# Patient Record
Sex: FEMALE | Race: ASIAN | NOT HISPANIC OR LATINO | Employment: PART TIME | URBAN - METROPOLITAN AREA
[De-identification: names, ages, dates, MRNs, and addresses within clinical notes are randomized per-mention and may not be internally consistent; named-entity substitution may affect disease eponyms.]

---

## 2017-06-01 DIAGNOSIS — Z00.00 PE (PHYSICAL EXAM), ANNUAL: ICD-10-CM

## 2017-06-01 RX ORDER — NORGESTIMATE AND ETHINYL ESTRADIOL 7DAYSX3 28
KIT ORAL
Qty: 84 TABLET | Refills: 2 | Status: SHIPPED | OUTPATIENT
Start: 2017-06-01 | End: 2018-01-10 | Stop reason: SINTOL

## 2017-10-13 ENCOUNTER — OFFICE VISIT (OUTPATIENT)
Dept: INTERNAL MEDICINE | Facility: CLINIC | Age: 22
End: 2017-10-13

## 2017-10-13 VITALS
WEIGHT: 119 LBS | HEART RATE: 79 BPM | SYSTOLIC BLOOD PRESSURE: 110 MMHG | OXYGEN SATURATION: 98 % | DIASTOLIC BLOOD PRESSURE: 70 MMHG | TEMPERATURE: 98 F | HEIGHT: 64 IN | BODY MASS INDEX: 20.32 KG/M2

## 2017-10-13 DIAGNOSIS — J06.9 ACUTE URI: Primary | ICD-10-CM

## 2017-10-13 PROCEDURE — 99213 OFFICE O/P EST LOW 20 MIN: CPT | Performed by: NURSE PRACTITIONER

## 2017-10-13 RX ORDER — AZITHROMYCIN 250 MG/1
TABLET, FILM COATED ORAL
Qty: 6 TABLET | Refills: 0 | Status: SHIPPED | OUTPATIENT
Start: 2017-10-13 | End: 2018-01-03

## 2017-10-13 RX ORDER — GUAIFENESIN 600 MG/1
1200 TABLET, EXTENDED RELEASE ORAL 2 TIMES DAILY
Qty: 14 TABLET
Start: 2017-10-13 | End: 2017-10-20

## 2017-10-13 NOTE — PROGRESS NOTES
Anh Schaffer is a 22 y.o. female who presents due to respiratory symptoms.    URI    This is a new problem. The current episode started 1 to 4 weeks ago (sx began yandel 1 week). The problem has been gradually worsening. Associated symptoms include congestion, coughing, headaches, rhinorrhea and a sore throat. Pertinent negatives include no abdominal pain, chest pain, diarrhea, ear pain, nausea, neck pain, sneezing, vomiting or wheezing. She has tried antihistamine for the symptoms. The treatment provided mild relief.        The following portions of the patient's history were reviewed and updated as appropriate: allergies, current medications, past social history and problem list.    Past Medical History:   Diagnosis Date   • Hip pain    • Irregular menses          Current Outpatient Prescriptions:   •  Loratadine 10 MG capsule, Take  by mouth., Disp: , Rfl:   •  norgestimate-ethinyl estradiol (ORTHO TRI-CYCLEN,TRINESSA) 0.18/0.215/0.25 MG-35 MCG per tablet, TAKE ONE TABLET BY MOUTH DAILY, Disp: 84 tablet, Rfl: 2  •  azithromycin (ZITHROMAX Z-SAW) 250 MG tablet, Take 2 tablets the first day, then 1 tablet daily for 4 days., Disp: 6 tablet, Rfl: 0  •  guaiFENesin (MUCINEX) 600 MG 12 hr tablet, Take 2 tablets by mouth 2 (Two) Times a Day for 7 days., Disp: 14 tablet, Rfl:     No Known Allergies    Review of Systems   Constitutional: Positive for fatigue. Negative for appetite change, chills and fever.   HENT: Positive for congestion, postnasal drip, rhinorrhea and sore throat. Negative for ear discharge, ear pain, facial swelling, hearing loss, sinus pressure, sneezing and tinnitus.    Respiratory: Positive for cough. Negative for chest tightness, shortness of breath and wheezing.    Cardiovascular: Negative for chest pain, palpitations and leg swelling.   Gastrointestinal: Negative for abdominal pain, diarrhea, nausea and vomiting.   Musculoskeletal: Negative for neck pain and neck stiffness.   Neurological:  "Positive for headaches.   Hematological: Negative for adenopathy.       Objective   Vitals:    10/13/17 1356   BP: 110/70   BP Location: Left arm   Patient Position: Sitting   Cuff Size: Adult   Pulse: 79   Temp: 98 °F (36.7 °C)   TempSrc: Oral   SpO2: 98%   Weight: 119 lb (54 kg)   Height: 64\" (162.6 cm)     Physical Exam   Constitutional: She appears well-developed and well-nourished. She is cooperative. She does not have a sickly appearance. She does not appear ill.   HENT:   Head: Normocephalic.   Right Ear: Hearing and external ear normal. No drainage, swelling or tenderness. Tympanic membrane is bulging. Tympanic membrane is not erythematous. No middle ear effusion. No decreased hearing is noted.   Left Ear: Hearing and external ear normal. No drainage, swelling or tenderness. Tympanic membrane is bulging. Tympanic membrane is not erythematous.  No middle ear effusion. No decreased hearing is noted.   Nose: Nose normal. No mucosal edema, rhinorrhea, sinus tenderness or nasal deformity. Right sinus exhibits no maxillary sinus tenderness and no frontal sinus tenderness. Left sinus exhibits no maxillary sinus tenderness and no frontal sinus tenderness.   Mouth/Throat: Mucous membranes are normal. Posterior oropharyngeal erythema present.   Eyes: Conjunctivae and lids are normal.   Neck: Trachea normal.   Cardiovascular: Regular rhythm, normal heart sounds and normal pulses.    No murmur heard.  Pulmonary/Chest: Breath sounds normal. No respiratory distress. She has no decreased breath sounds. She has no wheezes. She has no rhonchi. She has no rales.   Lymphadenopathy:     She has no cervical adenopathy.   Neurological: She is alert.   Skin: Skin is warm, dry and intact.   Nursing note and vitals reviewed.      Assessment/Plan   Tia was seen today for sinus problem.    Diagnoses and all orders for this visit:    Acute URI  Comments:  continue Loratadine for postnasal drainage  Orders:  -     guaiFENesin (MUCINEX) " 600 MG 12 hr tablet; Take 2 tablets by mouth 2 (Two) Times a Day for 7 days.  -     azithromycin (ZITHROMAX Z-SAW) 250 MG tablet; Take 2 tablets the first day, then 1 tablet daily for 4 days.    Discussed interaction of birth control pills with antibiotics, advised to use back up contraception until next menses.

## 2018-01-03 ENCOUNTER — OFFICE VISIT (OUTPATIENT)
Dept: INTERNAL MEDICINE | Facility: CLINIC | Age: 23
End: 2018-01-03

## 2018-01-03 VITALS
WEIGHT: 121 LBS | DIASTOLIC BLOOD PRESSURE: 72 MMHG | HEIGHT: 64 IN | BODY MASS INDEX: 20.66 KG/M2 | HEART RATE: 80 BPM | SYSTOLIC BLOOD PRESSURE: 112 MMHG | OXYGEN SATURATION: 98 %

## 2018-01-03 DIAGNOSIS — Z00.00 PE (PHYSICAL EXAM), ANNUAL: Primary | ICD-10-CM

## 2018-01-03 LAB
ALBUMIN SERPL-MCNC: 4.7 G/DL (ref 3.5–5.2)
ALBUMIN/GLOB SERPL: 1.9 G/DL
ALP SERPL-CCNC: 63 U/L (ref 39–117)
ALT SERPL-CCNC: 9 U/L (ref 1–33)
AST SERPL-CCNC: 12 U/L (ref 1–32)
BACTERIA UR QL AUTO: ABNORMAL /HPF
BASOPHILS # BLD AUTO: 0.05 10*3/MM3 (ref 0–0.2)
BASOPHILS NFR BLD AUTO: 1 % (ref 0–1.5)
BILIRUB SERPL-MCNC: 0.2 MG/DL (ref 0.1–1.2)
BILIRUB UR QL STRIP: NEGATIVE
BUN SERPL-MCNC: 10 MG/DL (ref 6–20)
BUN/CREAT SERPL: 12 (ref 7–25)
CALCIUM SERPL-MCNC: 9.8 MG/DL (ref 8.6–10.5)
CHLORIDE SERPL-SCNC: 100 MMOL/L (ref 98–107)
CHOLEST SERPL-MCNC: 174 MG/DL (ref 0–200)
CLARITY UR: CLEAR
CO2 SERPL-SCNC: 29.6 MMOL/L (ref 22–29)
COLOR UR: YELLOW
CREAT SERPL-MCNC: 0.83 MG/DL (ref 0.57–1)
EOSINOPHIL # BLD AUTO: 0.25 10*3/MM3 (ref 0–0.7)
EOSINOPHIL # BLD AUTO: 4.8 % (ref 0.3–6.2)
ERYTHROCYTE [DISTWIDTH] IN BLOOD BY AUTOMATED COUNT: 12.6 % (ref 11.7–13)
GLOBULIN SER CALC-MCNC: 2.5 GM/DL
GLUCOSE SERPL-MCNC: 88 MG/DL (ref 65–99)
GLUCOSE UR STRIP-MCNC: NEGATIVE MG/DL
HCT VFR BLD AUTO: 44.3 % (ref 35.6–45.5)
HDLC SERPL-MCNC: 79 MG/DL (ref 40–60)
HGB BLD-MCNC: 14.1 G/DL (ref 11.9–15.5)
HGB UR QL STRIP.AUTO: ABNORMAL
HYALINE CASTS UR QL AUTO: ABNORMAL /LPF
IMM GRANULOCYTES # BLD: 0 10*3/MM3 (ref 0–0.03)
IMM GRANULOCYTES NFR BLD: 0 % (ref 0–0.5)
KETONES UR QL STRIP: NEGATIVE
LDLC SERPL CALC-MCNC: 76 MG/DL (ref 0–100)
LEUKOCYTE ESTERASE UR QL STRIP.AUTO: NEGATIVE
LYMPHOCYTES # BLD AUTO: 2.13 10*3/MM3 (ref 0.9–4.8)
LYMPHOCYTES NFR BLD AUTO: 41 % (ref 19.6–45.3)
MCH RBC QN AUTO: 29.4 PG (ref 26.9–32)
MCHC RBC AUTO-ENTMCNC: 31.8 G/DL (ref 32.4–36.3)
MCV RBC AUTO: 92.3 FL (ref 80.5–98.2)
MONOCYTES # BLD AUTO: 0.44 10*3/MM3 (ref 0.2–1.2)
MONOCYTES NFR BLD AUTO: 8.5 % (ref 5–12)
MUCOUS THREADS URNS QL MICRO: ABNORMAL /HPF
NEUTROPHILS # BLD AUTO: 2.32 10*3/MM3 (ref 1.9–8.1)
NEUTROPHILS NFR BLD AUTO: 44.7 % (ref 42.7–76)
NITRITE UR QL STRIP: NEGATIVE
PH UR STRIP.AUTO: 6 [PH] (ref 5–8)
PLATELET # BLD AUTO: 329 10*3/MM3 (ref 140–500)
POTASSIUM SERPL-SCNC: 4.6 MMOL/L (ref 3.5–5.2)
PROT SERPL-MCNC: 7.2 G/DL (ref 6–8.5)
PROT UR QL STRIP: NEGATIVE
RBC # BLD AUTO: 4.8 10*6/MM3 (ref 3.9–5.2)
RBC # UR: ABNORMAL /HPF
REF LAB TEST METHOD: ABNORMAL
SODIUM SERPL-SCNC: 139 MMOL/L (ref 136–145)
SP GR UR STRIP: 1.02 (ref 1–1.03)
SQUAMOUS #/AREA URNS HPF: ABNORMAL /HPF
TRIGL SERPL-MCNC: 96 MG/DL (ref 0–150)
TSH SERPL-ACNC: 2.59 MIU/ML (ref 0.27–4.2)
UROBILINOGEN UR QL STRIP: ABNORMAL
VLDLC SERPL-MCNC: 19.2 MG/DL (ref 5–40)
WBC # BLD AUTO: 5.19 10*3/MM3 (ref 4.5–10.7)
WBC UR QL AUTO: ABNORMAL /HPF

## 2018-01-03 PROCEDURE — 99395 PREV VISIT EST AGE 18-39: CPT | Performed by: NURSE PRACTITIONER

## 2018-01-03 PROCEDURE — 81001 URINALYSIS AUTO W/SCOPE: CPT | Performed by: NURSE PRACTITIONER

## 2018-01-03 NOTE — PROGRESS NOTES
Anh Schaffer is a 22 y.o. female who presents for a physical exam.    HPI Comments: She is home for her winter break from [employer], finishing her senior year. She is doing well but does c/o mood swings before her period, would like to adjust her birth control pills. Her wrist pain has improved with wearing wrist splints at night.       The following portions of the patient's history were reviewed and updated as appropriate: allergies, current medications, past social history and problem list.    Past Medical History:   Diagnosis Date   • Hip pain    • Irregular menses          Current Outpatient Prescriptions:   •  Loratadine 10 MG capsule, Take  by mouth., Disp: , Rfl:   •  norgestimate-ethinyl estradiol (ORTHO TRI-CYCLEN,TRINESSA) 0.18/0.215/0.25 MG-35 MCG per tablet, TAKE ONE TABLET BY MOUTH DAILY, Disp: 84 tablet, Rfl: 2    No Known Allergies    Review of Systems   Constitutional: Negative for activity change, appetite change, chills, diaphoresis, fatigue, fever and unexpected weight change.   HENT: Negative for congestion, dental problem, drooling, ear discharge, ear pain, facial swelling, hearing loss, mouth sores, nosebleeds, postnasal drip, rhinorrhea, sinus pressure, sore throat, tinnitus and trouble swallowing.    Eyes: Negative for photophobia, pain, discharge, redness, itching and visual disturbance.   Respiratory: Negative for apnea, cough, choking, chest tightness, shortness of breath and wheezing.    Cardiovascular: Negative for chest pain, palpitations and leg swelling.        No orthopnea, PND, COFFMAN   Gastrointestinal: Negative for abdominal pain, blood in stool, constipation, diarrhea, nausea and vomiting.   Endocrine: Negative for cold intolerance, heat intolerance, polydipsia and polyuria.   Genitourinary: Negative for decreased urine volume, dysuria, enuresis, flank pain, frequency, hematuria and urgency.   Musculoskeletal: Positive for arthralgias (intermittent hip pain  "(bilateral), has seen ortho in past). Negative for back pain, gait problem, joint swelling, myalgias, neck pain and neck stiffness.   Skin: Negative for color change and rash.        No hair changes, no nail changes   Allergic/Immunologic: Negative for environmental allergies, food allergies and immunocompromised state.   Neurological: Negative for dizziness, tremors, seizures, syncope, speech difficulty, weakness, light-headedness, numbness and headaches.   Hematological: Negative for adenopathy. Does not bruise/bleed easily.   Psychiatric/Behavioral: Negative for agitation, confusion, decreased concentration, dysphoric mood, sleep disturbance and suicidal ideas. The patient is not nervous/anxious.        Objective   Vitals:    01/03/18 0758   BP: 112/72   BP Location: Left arm   Patient Position: Sitting   Cuff Size: Adult   Pulse: 80   SpO2: 98%   Weight: 54.9 kg (121 lb)   Height: 162.6 cm (64\")     Physical Exam   Constitutional: She is oriented to person, place, and time. She appears well-developed and well-nourished. No distress.   HENT:   Right Ear: Hearing, tympanic membrane, external ear and ear canal normal.   Left Ear: Hearing, tympanic membrane, external ear and ear canal normal.   Nose: Right sinus exhibits no maxillary sinus tenderness and no frontal sinus tenderness. Left sinus exhibits no maxillary sinus tenderness and no frontal sinus tenderness.   Eyes: Conjunctivae, EOM and lids are normal. Pupils are equal, round, and reactive to light.   Neck: Trachea normal and phonation normal. Neck supple. Normal carotid pulses and no JVD present. Carotid bruit is not present. No thyroid mass and no thyromegaly present.   Cardiovascular: Normal rate, regular rhythm, S1 normal and S2 normal.  PMI is not displaced.  Exam reveals no gallop and no friction rub.    No murmur heard.  Pulses:       Carotid pulses are 2+ on the right side, and 2+ on the left side.       Radial pulses are 2+ on the right side, and 2+ " on the left side.        Dorsalis pedis pulses are 2+ on the right side, and 2+ on the left side.   Pulmonary/Chest: Effort normal and breath sounds normal. She has no wheezes. She has no rhonchi. She has no rales. Chest wall is not dull to percussion. Right breast exhibits no inverted nipple, no mass, no nipple discharge, no skin change and no tenderness. Left breast exhibits no inverted nipple, no mass, no nipple discharge, no skin change and no tenderness.   Abdominal: Soft. Normal appearance, normal aorta and bowel sounds are normal. She exhibits no abdominal bruit and no mass. There is no hepatosplenomegaly. There is no tenderness.   Musculoskeletal: Normal range of motion. She exhibits no edema or tenderness.   Lymphadenopathy:     She has no cervical adenopathy.        Right: No supraclavicular adenopathy present.        Left: No supraclavicular adenopathy present.   Neurological: She is alert and oriented to person, place, and time. She has normal strength and normal reflexes. No cranial nerve deficit or sensory deficit. Coordination normal.   Skin: Skin is warm and dry. No rash noted.   Psychiatric: She has a normal mood and affect. Her speech is normal and behavior is normal. Judgment and thought content normal. Cognition and memory are normal. She is attentive.   Nursing note and vitals reviewed.      Assessment/Plan   Tia was seen today for annual exam.    Diagnoses and all orders for this visit:    PE (physical exam), annual  Comments:  check fasting labs  Orders:  -     CBC Auto Differential; Future  -     Comprehensive Metabolic Panel; Future  -     Lipid Panel; Future  -     TSH; Future  -     Urinalysis With / Microscopic If Indicated - Urine, Clean Catch; Future  -     Cancel: CBC Auto Differential  -     Cancel: Comprehensive Metabolic Panel  -     Cancel: Lipid Panel  -     Cancel: TSH  -     Urinalysis With / Microscopic If Indicated - Urine, Clean Catch  -     Comprehensive Metabolic Panel;  Future  -     Lipid Panel; Future  -     TSH; Future  -     Comprehensive Metabolic Panel  -     Lipid Panel  -     TSH  -     Urinalysis, Microscopic Only - Urine, Clean Catch; Future  -     Urinalysis, Microscopic Only - Urine, Clean Catch  -     Scan Slide; Future  -     Cancel: Scan Slide  -     Manual Differential; Future  -     Cancel: Manual Differential  -     CBC & Differential; Future  -     CBC & Differential  -     CBC Auto Differential    She is currently on menses, will return next week for pap smear.

## 2018-01-10 ENCOUNTER — PROCEDURE VISIT (OUTPATIENT)
Dept: INTERNAL MEDICINE | Facility: CLINIC | Age: 23
End: 2018-01-10

## 2018-01-10 VITALS
HEART RATE: 73 BPM | HEIGHT: 64 IN | OXYGEN SATURATION: 99 % | WEIGHT: 124 LBS | DIASTOLIC BLOOD PRESSURE: 70 MMHG | BODY MASS INDEX: 21.17 KG/M2 | SYSTOLIC BLOOD PRESSURE: 112 MMHG

## 2018-01-10 DIAGNOSIS — R87.612 LGSIL ON PAP SMEAR OF CERVIX: ICD-10-CM

## 2018-01-10 DIAGNOSIS — Z30.41 ENCOUNTER FOR SURVEILLANCE OF CONTRACEPTIVE PILLS: ICD-10-CM

## 2018-01-10 DIAGNOSIS — Z12.4 SCREENING FOR CERVICAL CANCER: Primary | ICD-10-CM

## 2018-01-10 PROCEDURE — 99395 PREV VISIT EST AGE 18-39: CPT | Performed by: NURSE PRACTITIONER

## 2018-01-10 RX ORDER — NORETHINDRONE ACETATE AND ETHINYL ESTRADIOL 1; .02 MG/1; MG/1
1 TABLET ORAL DAILY
Qty: 21 TABLET | Refills: 12 | Status: SHIPPED | OUTPATIENT
Start: 2018-01-10 | End: 2018-04-19

## 2018-01-11 NOTE — PROGRESS NOTES
"Subjective   Tia Schaffer is a 22 y.o. female who presents for a pap smear.    HPI Comments: She c/o increased irritability and mood swings prior to period since beginning birth control pills, would like to change.    Gynecologic Exam   The patient's pertinent negatives include no genital itching, genital lesions, genital odor or pelvic pain. The patient is experiencing no pain. She is not pregnant. Pertinent negatives include no abdominal pain, dysuria, flank pain or frequency. She is sexually active. No, her partner does not have an STD. She uses oral contraceptives for contraception. Her menstrual history has been regular.        The following portions of the patient's history were reviewed and updated as appropriate: allergies, current medications, past social history and problem list.    Past Medical History:   Diagnosis Date   • Hip pain    • Irregular menses          Current Outpatient Prescriptions:   •  Loratadine 10 MG capsule, Take  by mouth., Disp: , Rfl:   •  norethindrone-ethinyl estradiol (MICROGESTIN 1/20) 1-20 MG-MCG per tablet, Take 1 tablet by mouth Daily., Disp: 21 tablet, Rfl: 12    No Known Allergies    Review of Systems   Constitutional: Negative for fatigue.   HENT: Positive for postnasal drip. Negative for congestion.    Respiratory: Negative for shortness of breath.    Cardiovascular: Negative for chest pain.   Gastrointestinal: Negative for abdominal pain.   Genitourinary: Negative for difficulty urinating, dyspareunia, dysuria, flank pain, frequency, genital sores, menstrual problem and pelvic pain.   Psychiatric/Behavioral: Positive for dysphoric mood. Negative for sleep disturbance. The patient is not nervous/anxious.        Objective   Vitals:    01/10/18 1538   BP: 112/70   BP Location: Left arm   Patient Position: Sitting   Cuff Size: Adult   Pulse: 73   SpO2: 99%   Weight: 56.2 kg (124 lb)   Height: 162.6 cm (64\")     Physical Exam   Constitutional: She appears well-developed and " well-nourished. No distress.   Eyes: Conjunctivae are normal.   Neck: Normal range of motion.   Cardiovascular: Normal rate and regular rhythm.    Pulmonary/Chest: Effort normal and breath sounds normal. No respiratory distress.   Abdominal: Soft. Bowel sounds are normal. There is no tenderness.   Genitourinary: Vagina normal and uterus normal. Pelvic exam was performed with patient supine. There is no rash, tenderness, lesion or injury on the right labia. There is no rash, tenderness, lesion or injury on the left labia. Cervix exhibits no motion tenderness. Right adnexum displays no mass, no tenderness and no fullness. Left adnexum displays no mass, no tenderness and no fullness. No vaginal discharge found.       Assessment/Plan   Tia was seen today for gynecologic exam.    Diagnoses and all orders for this visit:    Screening for cervical cancer  -     Pap IG, Rfx HPV ASCU - , Cervix; Future  -     Pap IG, Rfx HPV ASCU - , Cervix    Encounter for surveillance of contraceptive pills  Comments:  will change to lower dose BCP and monitor mood swings/irritability prior to menses  Orders:  -     norethindrone-ethinyl estradiol (MICROGESTIN 1/20) 1-20 MG-MCG per tablet; Take 1 tablet by mouth Daily.

## 2018-01-15 LAB
CHROM ANALY OVERALL INTERP-IMP: ABNORMAL
CONV .: ABNORMAL
CONV .: ABNORMAL
CONV PERFORMED BY:: ABNORMAL
DX ICD CODE: ABNORMAL
HIV 1 & 2 AB SER-IMP: ABNORMAL
PATHOLOGIST NAME: ABNORMAL
PATHOLOGIST PROVIDED ICD-10:: ABNORMAL
REF LAB TEST METHOD: ABNORMAL
STAT OF ADQ CVX/VAG CYTO-IMP: ABNORMAL

## 2018-01-16 LAB — SPECIMEN STATUS: NORMAL

## 2018-01-17 LAB — HPV I/H RISK 1 DNA CVX QL PROBE+SIG AMP: NEGATIVE

## 2018-01-26 ENCOUNTER — TELEPHONE (OUTPATIENT)
Dept: INTERNAL MEDICINE | Facility: CLINIC | Age: 23
End: 2018-01-26

## 2018-01-26 DIAGNOSIS — R87.612 LOW GRADE SQUAMOUS INTRAEPITH LESION ON CYTOLOGIC SMEAR CERVIX (LGSIL): Primary | ICD-10-CM

## 2018-01-26 NOTE — TELEPHONE ENCOUNTER
----- Message from FABBY Enamorado sent at 1/26/2018  9:32 AM EST -----  Dicussed with mother, Cyndi Schaffer (on 3rd party release). Please mail a copy of the pap smear to home address (in chart) and give Cyndi's cell phone number ([phone]) to [facility] for the referral. Thanks.

## 2018-01-30 ENCOUNTER — TELEPHONE (OUTPATIENT)
Dept: INTERNAL MEDICINE | Facility: CLINIC | Age: 23
End: 2018-01-30

## 2018-01-30 NOTE — TELEPHONE ENCOUNTER
Discussed with mother-she denies vaginal pain with intermittent odor. She is scheduled with a GYN in March to f/u on abnl pap. She is advised to see GYN in [place] if discharge persists (she is due for her period) or f/u in this office.

## 2018-01-30 NOTE — TELEPHONE ENCOUNTER
----- Message from Nelda Gutierrez sent at 1/30/2018  9:33 AM EST -----  Contact: Cyndi, mother - Dr Schmidt's pt - RE: Rx & appt  Pt's mother calling and would like to inform that pt has appt w/ [facility]. Pt has appt on 03/22/2018 for procedure.   Pt would like to know pt has discharge - sometimes white or green. Pt informs it comes & goes. Pt would like to inform that it has a small smell. Pt would like to know if a Rx is needed - antibiotic? Could you please call mother to discuss as pt is in school @ [employer].    [facility] [place]  # [phone]    Cyndi, mother  [phone]

## 2018-03-23 DIAGNOSIS — Z00.00 PE (PHYSICAL EXAM), ANNUAL: ICD-10-CM

## 2018-03-23 RX ORDER — NORGESTIMATE AND ETHINYL ESTRADIOL
KIT
Qty: 84 TABLET | Refills: 2 | OUTPATIENT
Start: 2018-03-23

## 2018-03-23 NOTE — TELEPHONE ENCOUNTER
I spoke to Ms. Schaffer and she stated that she is doing fine on the Microgestin 1/20 and she is on her 2nd pack.    Her [facility] sent us a refill request automatically on Tri-Previfem birth control  with out her request.     I told her that I would refuse this. She stated she understood and thank you.

## 2018-03-23 NOTE — TELEPHONE ENCOUNTER
pls call - Chaparrita changed to lower dose birth control med - can we continue low dose pill or is she having problems with low dose?

## 2018-03-26 DIAGNOSIS — Z00.00 PE (PHYSICAL EXAM), ANNUAL: ICD-10-CM

## 2018-03-27 ENCOUNTER — TELEPHONE (OUTPATIENT)
Dept: INTERNAL MEDICINE | Facility: CLINIC | Age: 23
End: 2018-03-27

## 2018-03-27 NOTE — TELEPHONE ENCOUNTER
----- Message from Aida Colbert sent at 3/27/2018 10:26 AM EDT -----  Contact: Patients mother Marsha  Patients mother called and said they need a refill on norethindrone-ethinyl estradiol (MICROGESTIN 1/20) 1-20 MG-MCG per tablet. Please advise    Patient:[phone](Marsha)    Pharmacy:[place] [address] [place] [facility] - [address]  - [phone] [facility] [phone] FX

## 2018-03-27 NOTE — TELEPHONE ENCOUNTER
Call made to patients mother Cyndi to inform her that she had refills of Microgestin 1/20 sent in to her [facility] pharmacy on 1/20/18.    Her mother stated that she would contact the pharmacy and have them to fill this for her her daughter.

## 2018-03-28 RX ORDER — NORGESTIMATE AND ETHINYL ESTRADIOL
KIT
Qty: 84 TABLET | Refills: 2 | Status: SHIPPED | OUTPATIENT
Start: 2018-03-28 | End: 2018-10-15

## 2018-04-19 ENCOUNTER — PROCEDURE VISIT (OUTPATIENT)
Dept: OBSTETRICS AND GYNECOLOGY | Age: 23
End: 2018-04-19

## 2018-04-19 VITALS
HEIGHT: 64 IN | BODY MASS INDEX: 20.66 KG/M2 | SYSTOLIC BLOOD PRESSURE: 102 MMHG | DIASTOLIC BLOOD PRESSURE: 70 MMHG | WEIGHT: 121 LBS

## 2018-04-19 DIAGNOSIS — Z11.3 SCREEN FOR STD (SEXUALLY TRANSMITTED DISEASE): ICD-10-CM

## 2018-04-19 DIAGNOSIS — Z01.812 PRE-PROCEDURE LAB EXAM: ICD-10-CM

## 2018-04-19 DIAGNOSIS — N89.8 VAGINAL DISCHARGE: ICD-10-CM

## 2018-04-19 DIAGNOSIS — R87.612 LGSIL ON PAP SMEAR OF CERVIX: Primary | ICD-10-CM

## 2018-04-19 LAB
B-HCG UR QL: NEGATIVE
INTERNAL NEGATIVE CONTROL: NEGATIVE
INTERNAL POSITIVE CONTROL: POSITIVE
Lab: NORMAL

## 2018-04-19 PROCEDURE — 81025 URINE PREGNANCY TEST: CPT | Performed by: OBSTETRICS & GYNECOLOGY

## 2018-04-19 PROCEDURE — 57452 EXAM OF CERVIX W/SCOPE: CPT | Performed by: OBSTETRICS & GYNECOLOGY

## 2018-04-19 PROCEDURE — 99203 OFFICE O/P NEW LOW 30 MIN: CPT | Performed by: OBSTETRICS & GYNECOLOGY

## 2018-04-19 NOTE — PROGRESS NOTES
"This encounter was created in error - please disregard.Colposcopy Procedure Note    Indications: Pap smear {0-10:78684} months ago showed: {Findings; lab pap smear results:15653::\"no abnormalities\"}. The prior pap showed {Findings; lab pap smear results:40145::\"no abnormalities\"}.  Prior cervical/vaginal disease: {Exam; colposcopy summary:733}. Prior cervical treatment: {Therapies; recommendations colposcopy:727}.    Procedure Details   The risks and benefits of the procedure and {verbal:40350} informed consent obtained.    Speculum placed in vagina and excellent visualization of cervix achieved, cervix swabbed x 3 with acetic acid solution.    Findings:  Cervix: {Findings; colposcopy:728}; {Procedures; colposcopy:729}.  Vaginal inspection: {Findings; colposcopy:730}.  Vulvar colposcopy: {Exam; colposcopy vulvar:731}.    Specimens: ***    Complications: {complications:07024}.    Plan:  {Plan; colonoscopy:734}  "

## 2018-04-19 NOTE — PROGRESS NOTES
"Subjective     Chief Complaint   Patient presents with   • Colposcopy       Tia Sarbjit is a 22 y.o.  whose LMP is Patient's last menstrual period was 2018 (approximate). presents for [facility] as referral for LGSIL pap 2018. HPV was negative for high risk type. This was her first pap. She is sexually active and takes ocps. She has had screening for STD. She has had the Gardasil vaccine series. Her menses are normal q mo, lasting 3-4 days. She complains of some vaginal dc that has been present for a long time. Denies itching or odor, but it's just bothersome.      No Additional Complaints Reported    The following portions of the patient's history were reviewed and updated as appropriate:vital signs, allergies, current medications, past family history, past medical history, past social history, past surgical history and problem list      Review of Systems   Constitutional: negative for fever, chills, activity change, appetite change, fatigue and unexpected weight change.  Eyes: negative  Ears, nose, mouth, throat, and face: negative  Respiratory: negative  Cardiovascular: negative  Gastrointestinal: negative  Genitourinary:positive for vaginal discharge  Hematologic/lymphatic: negative  Musculoskeletal:negative  Neurological: negative  Behavioral/Psych: negative     Objective      /70   Ht 162.6 cm (64\")   Wt 54.9 kg (121 lb)   LMP 2018 (Approximate)   BMI 20.77 kg/m²     Physical Exam    General:   alert, appears stated age and no distress   Heart: Not performed today   Lungs: Not performed today.   Breast: Not performed today   Neck:     Abdomen: {    CVA: Not performed today   Pelvis: External genitalia: normal general appearance  Urinary system: urethral meatus normal  Vaginal: normal mucosa without prolapse or lesions and small amt of thick yellow-white dc in vault  Cervix: normal appearance and no lesions, nullip os   Extremities: Extremities normal, atraumatic, no cyanosis or " edema   Neurologic: negative   Psychiatric: Normal affect, judgement, and mood       Lab Review   Labs: 1/2018 pap LGSIL, HPV- High risk , urine pregnancy test negative    Imaging   No data reviewed    Assessment/Plan     ASSESSMENT  1. LGSIL on Pap smear of cervix    2. Pre-procedure lab exam    3. Vaginal discharge    4. Screen for STD (sexually transmitted disease)        PLAN  1.   Orders Placed This Encounter   Procedures   • NuSwab VG+ - Swab, Vagina   • POC Pregnancy, Urine       2. Medications prescribed this encounter:      No orders of the defined types were placed in this encounter.    Colposcopy Procedure Note    Indications: Pap smear 3 months ago showed: low-grade squamous intraepithelial neoplasia (LGSIL - encompassing HPV,mild dysplasia,MIKE I). No prior paps.   Prior cervical treatment: no treatment.    Procedure Details   The risks and benefits of the procedure and Verbal informed consent obtained.    Speculum placed in vagina and excellent visualization of cervix achieved, cervix swabbed x 3 with acetic acid solution. No biopsy performed. Pt tolerated procedure well.    Findings:  Cervix: no visible lesions;         Specimens: none    Complications: none.    Plan:  Discussed abnormal pap, HPV and cervical cancer, plan conservative f/u w/ pap q 6 mo. Swab sent for vaginitis panel and GC/Chl testing      Follow up: 6 month(s)    Sonya Bernal MD  4/19/2018

## 2018-04-23 LAB
A VAGINAE DNA VAG QL NAA+PROBE: NORMAL SCORE
BVAB2 DNA VAG QL NAA+PROBE: NORMAL SCORE
C ALBICANS DNA VAG QL NAA+PROBE: NEGATIVE
C GLABRATA DNA VAG QL NAA+PROBE: NEGATIVE
C TRACH RRNA SPEC QL NAA+PROBE: NEGATIVE
MEGA1 DNA VAG QL NAA+PROBE: NORMAL SCORE
N GONORRHOEA RRNA SPEC QL NAA+PROBE: NEGATIVE
T VAGINALIS RRNA SPEC QL NAA+PROBE: NEGATIVE

## 2018-04-24 ENCOUNTER — TELEPHONE (OUTPATIENT)
Dept: OBSTETRICS AND GYNECOLOGY | Age: 23
End: 2018-04-24

## 2018-04-24 NOTE — TELEPHONE ENCOUNTER
----- Message from Sonya Bernal MD sent at 4/24/2018 11:40 AM EDT -----  Swab was completely negative, tested for GC/Chl/trich, BV and yeast

## 2018-05-01 ENCOUNTER — TELEPHONE (OUTPATIENT)
Dept: OBSTETRICS AND GYNECOLOGY | Age: 23
End: 2018-05-01

## 2018-05-14 ENCOUNTER — OFFICE VISIT (OUTPATIENT)
Dept: INTERNAL MEDICINE | Facility: CLINIC | Age: 23
End: 2018-05-14

## 2018-05-14 VITALS
BODY MASS INDEX: 20.43 KG/M2 | OXYGEN SATURATION: 98 % | TEMPERATURE: 98.4 F | DIASTOLIC BLOOD PRESSURE: 60 MMHG | WEIGHT: 119 LBS | HEART RATE: 71 BPM | SYSTOLIC BLOOD PRESSURE: 98 MMHG

## 2018-05-14 DIAGNOSIS — R68.89 ITCHY EYES: ICD-10-CM

## 2018-05-14 DIAGNOSIS — J01.90 ACUTE SINUSITIS, RECURRENCE NOT SPECIFIED, UNSPECIFIED LOCATION: Primary | ICD-10-CM

## 2018-05-14 PROCEDURE — 99213 OFFICE O/P EST LOW 20 MIN: CPT | Performed by: NURSE PRACTITIONER

## 2018-05-14 RX ORDER — GUAIFENESIN 600 MG/1
600 TABLET, EXTENDED RELEASE ORAL 2 TIMES DAILY
Qty: 14 TABLET | Refills: 0
Start: 2018-05-14 | End: 2018-05-21

## 2018-05-14 RX ORDER — KETOTIFEN FUMARATE 0.35 MG/ML
1 SOLUTION/ DROPS OPHTHALMIC 2 TIMES DAILY PRN
Qty: 5 ML | Refills: 0
Start: 2018-05-14 | End: 2020-06-29 | Stop reason: SDUPTHER

## 2018-05-14 RX ORDER — AZITHROMYCIN 250 MG/1
250 TABLET, FILM COATED ORAL DAILY
Qty: 6 TABLET | Refills: 0 | Status: SHIPPED | OUTPATIENT
Start: 2018-05-14 | End: 2018-05-19

## 2018-05-14 NOTE — PATIENT INSTRUCTIONS

## 2018-05-14 NOTE — PROGRESS NOTES
Subjective   Tia Sarbjit is a 22 y.o. female.     No recent air travel . Her boyfriend has been sick. She is moving to [place].       Sinus Problem   This is a new problem. The current episode started 1 to 4 weeks ago. The problem has been gradually worsening since onset. There has been no fever. Associated symptoms include congestion, coughing, headaches and sinus pressure. Pertinent negatives include no chills, ear pain or shortness of breath. (Right ear pressure ) Treatments tried: mucinex, bendarly, allergy medication, nasacort         The following portions of the patient's history were reviewed and updated as appropriate: allergies, current medications and problem list.    Review of Systems   Constitutional: Negative for appetite change, chills, fatigue and fever.   HENT: Positive for congestion, postnasal drip, rhinorrhea, sinus pain and sinus pressure. Negative for ear discharge, ear pain, facial swelling, hearing loss and tinnitus.    Respiratory: Positive for cough. Negative for chest tightness, shortness of breath and wheezing.    Cardiovascular: Negative for chest pain, palpitations and leg swelling.   Gastrointestinal: Negative for abdominal pain, diarrhea, nausea and vomiting.   Allergic/Immunologic: Positive for environmental allergies.   Neurological: Positive for dizziness (intermittent ) and headaches.   Hematological: Negative for adenopathy.       Objective   Physical Exam   Constitutional: She appears well-developed and well-nourished. She is cooperative. She does not have a sickly appearance. She does not appear ill.   HENT:   Head: Normocephalic.   Right Ear: Hearing and external ear normal. No drainage, swelling or tenderness. No mastoid tenderness. Tympanic membrane is bulging. Tympanic membrane is not injected, not scarred and not erythematous. Tympanic membrane mobility is normal. No middle ear effusion. No decreased hearing is noted.   Left Ear: Hearing and external ear normal. No  drainage, swelling or tenderness. No mastoid tenderness. Tympanic membrane is bulging. Tympanic membrane is not injected, not scarred and not erythematous. Tympanic membrane mobility is normal.  No middle ear effusion. No decreased hearing is noted.   Nose: Mucosal edema and rhinorrhea present. No sinus tenderness or nasal deformity. Right sinus exhibits maxillary sinus tenderness. Right sinus exhibits no frontal sinus tenderness. Left sinus exhibits maxillary sinus tenderness. Left sinus exhibits no frontal sinus tenderness.   Mouth/Throat: Mucous membranes are normal. Normal dentition. Posterior oropharyngeal erythema (clear hypersecretions ) present.   Eyes: Conjunctivae and lids are normal. Pupils are equal, round, and reactive to light. Right eye exhibits no discharge and no exudate. Left eye exhibits no discharge and no exudate.   Neck: Trachea normal and normal range of motion. No edema present. No thyroid mass and no thyromegaly present.   Cardiovascular: Regular rhythm, normal heart sounds and normal pulses.    No murmur heard.  Pulmonary/Chest: Breath sounds normal. No respiratory distress. She has no decreased breath sounds. She has no wheezes. She has no rhonchi. She has no rales.   Lymphadenopathy:        Head (right side): No submental, no submandibular, no tonsillar, no preauricular, no posterior auricular and no occipital adenopathy present.        Head (left side): No submental, no submandibular, no tonsillar, no preauricular, no posterior auricular and no occipital adenopathy present.     She has no cervical adenopathy.   Neurological: She is alert.   Skin: Skin is warm, dry and intact. No cyanosis. Nails show no clubbing.       Assessment/Plan   Tia was seen today for sinus problem.    Diagnoses and all orders for this visit:    Acute sinusitis, recurrence not specified, unspecified location  Comments:  continue with nasacort, may add ocean spray, push fluids   Orders:  -     guaiFENesin (MUCINEX)  600 MG 12 hr tablet; Take 1 tablet by mouth 2 (Two) Times a Day for 7 days.  -     azithromycin (ZITHROMAX Z-SAW) 250 MG tablet; Take 1 tablet by mouth Daily for 5 days. Take 2 tablets the first day, then 1 tablet daily for 4 days.    Itchy eyes  Comments:  may use otc zaditor   Orders:  -     ketotifen (ZADITOR) 0.025 % ophthalmic solution; Administer 1 drop to both eyes 2 (Two) Times a Day As Needed (itchy eyes).    She was instructed to use alternative birth control while taking antibiotic and until next menses.

## 2018-10-15 ENCOUNTER — OFFICE VISIT (OUTPATIENT)
Dept: INTERNAL MEDICINE | Facility: CLINIC | Age: 23
End: 2018-10-15

## 2018-10-15 VITALS
SYSTOLIC BLOOD PRESSURE: 100 MMHG | DIASTOLIC BLOOD PRESSURE: 70 MMHG | HEART RATE: 68 BPM | WEIGHT: 121 LBS | BODY MASS INDEX: 20.66 KG/M2 | OXYGEN SATURATION: 98 % | HEIGHT: 64 IN

## 2018-10-15 DIAGNOSIS — N76.0 BACTERIAL VAGINOSIS: ICD-10-CM

## 2018-10-15 DIAGNOSIS — N89.8 VAGINAL DISCHARGE: ICD-10-CM

## 2018-10-15 DIAGNOSIS — B96.89 BACTERIAL VAGINOSIS: ICD-10-CM

## 2018-10-15 DIAGNOSIS — M72.2 PLANTAR FASCIITIS, LEFT: Primary | ICD-10-CM

## 2018-10-15 DIAGNOSIS — B37.31 VAGINAL CANDIDIASIS: ICD-10-CM

## 2018-10-15 LAB
B-HCG UR QL: NEGATIVE
CLUE CELLS SPEC QL WET PREP: ABNORMAL
HYDATID CYST SPEC WET PREP: ABNORMAL
INTERNAL NEGATIVE CONTROL: NEGATIVE
INTERNAL POSITIVE CONTROL: POSITIVE
KOH PREP NAIL: ABNORMAL
Lab: NORMAL
T VAGINALIS SPEC QL WET PREP: ABNORMAL
WBC SPEC QL WET PREP: ABNORMAL
YEAST GENITAL QL WET PREP: ABNORMAL

## 2018-10-15 PROCEDURE — 87210 SMEAR WET MOUNT SALINE/INK: CPT | Performed by: NURSE PRACTITIONER

## 2018-10-15 PROCEDURE — 99214 OFFICE O/P EST MOD 30 MIN: CPT | Performed by: NURSE PRACTITIONER

## 2018-10-15 PROCEDURE — 87220 TISSUE EXAM FOR FUNGI: CPT | Performed by: NURSE PRACTITIONER

## 2018-10-15 PROCEDURE — 81025 URINE PREGNANCY TEST: CPT | Performed by: NURSE PRACTITIONER

## 2018-10-15 RX ORDER — FLUCONAZOLE 150 MG/1
TABLET ORAL
Qty: 2 TABLET | Refills: 0 | Status: SHIPPED | OUTPATIENT
Start: 2018-10-15 | End: 2020-06-29 | Stop reason: SDUPTHER

## 2018-10-15 RX ORDER — NORETHINDRONE ACETATE AND ETHINYL ESTRADIOL 1; 20 MG/1; UG/1
1 TABLET ORAL DAILY
Refills: 12 | COMMUNITY
Start: 2018-08-20 | End: 2020-06-29 | Stop reason: SDUPTHER

## 2018-10-15 RX ORDER — METRONIDAZOLE 7.5 MG/G
GEL VAGINAL NIGHTLY
Qty: 70 G | Refills: 0 | Status: SHIPPED | OUTPATIENT
Start: 2018-10-15 | End: 2018-10-20

## 2018-10-16 NOTE — PROGRESS NOTES
Anh Schfafer is a 23 y.o. female who presents due to left foot pain. She also c/o vaginal discharge and itching.    She c/o a vaginal discharge with mild vaginal itching, denies dysuria. She recently returned from [place] where she had unprotected sex with her boyfriend. She does take Junel daily but reports missing 1 pill over recent cycle. She finished her menses last week which she states was normal.      Foot Pain   This is a new problem. The current episode started 1 to 4 weeks ago (sx began after walking long distances in [place]). The problem occurs daily. The problem has been waxing and waning. Associated symptoms include arthralgias. Pertinent negatives include no abdominal pain, chest pain, chills, congestion, coughing, fatigue, fever, headaches, joint swelling, numbness, sore throat or weakness. The symptoms are aggravated by standing (worse after prolonged sitting). She has tried nothing for the symptoms.   Vaginitis   This is a new problem. The current episode started in the past 7 days. The problem occurs daily. The problem has been gradually worsening. Associated symptoms include arthralgias. Pertinent negatives include no abdominal pain, chest pain, chills, congestion, coughing, fatigue, fever, headaches, joint swelling, numbness, sore throat or weakness.        The following portions of the patient's history were reviewed and updated as appropriate: allergies, current medications, past social history and problem list.    Past Medical History:   Diagnosis Date   • Abnormal Pap smear of cervix 01/2018    LGSIL   • Hip pain    • Irregular menses          Current Outpatient Prescriptions:   •  JUNEL 1/20 1-20 MG-MCG per tablet, Take 1 tablet by mouth Daily., Disp: , Rfl: 12  •  ketotifen (ZADITOR) 0.025 % ophthalmic solution, Administer 1 drop to both eyes 2 (Two) Times a Day As Needed (itchy eyes)., Disp: 5 mL, Rfl: 0  •  Loratadine 10 MG capsule, Take  by mouth., Disp: , Rfl:   •  fluconazole  "(DIFLUCAN) 150 MG tablet, Today, may repeat in 3 days if needed, Disp: 2 tablet, Rfl: 0  •  metroNIDAZOLE (METROGEL) 0.75 % vaginal gel, Insert  into the vagina Every Night for 5 days., Disp: 70 g, Rfl: 0    No Known Allergies    Review of Systems   Constitutional: Negative for chills, fatigue, fever and unexpected weight change.   HENT: Negative for congestion, ear pain, postnasal drip, sinus pressure, sore throat and trouble swallowing.    Eyes: Negative for visual disturbance.   Respiratory: Negative for cough, chest tightness and wheezing.    Cardiovascular: Negative for chest pain, palpitations and leg swelling.   Gastrointestinal: Negative for abdominal pain and blood in stool.   Genitourinary: Positive for vaginal discharge. Negative for dysuria, frequency and urgency.   Musculoskeletal: Positive for arthralgias. Negative for joint swelling.   Skin: Negative for color change.   Neurological: Negative for syncope, weakness, numbness and headaches.   Hematological: Does not bruise/bleed easily.       Objective   Vitals:    10/15/18 0930   BP: 100/70   BP Location: Left arm   Patient Position: Sitting   Cuff Size: Adult   Pulse: 68   SpO2: 98%   Weight: 54.9 kg (121 lb)   Height: 162.6 cm (64\")     Physical Exam   Constitutional: She appears well-developed and well-nourished. She is cooperative. She does not have a sickly appearance. She does not appear ill.   HENT:   Head: Normocephalic.   Right Ear: Hearing, tympanic membrane and external ear normal.   Left Ear: Hearing, tympanic membrane and external ear normal.   Nose: Nose normal. No mucosal edema, rhinorrhea, sinus tenderness or nasal deformity. Right sinus exhibits no maxillary sinus tenderness and no frontal sinus tenderness. Left sinus exhibits no maxillary sinus tenderness and no frontal sinus tenderness.   Mouth/Throat: Oropharynx is clear and moist and mucous membranes are normal. Normal dentition.   Eyes: Conjunctivae and lids are normal. Right eye " exhibits no discharge and no exudate. Left eye exhibits no discharge and no exudate.   Neck: Trachea normal. Carotid bruit is not present. No edema present. No thyroid mass present.   Cardiovascular: Regular rhythm, normal heart sounds and normal pulses.    No murmur heard.  Pulmonary/Chest: Breath sounds normal. No respiratory distress. She has no decreased breath sounds. She has no wheezes. She has no rhonchi. She has no rales.   Abdominal: Soft. There is no tenderness.   Genitourinary: There is erythema in the vagina. Vaginal discharge found.   Musculoskeletal:        Left foot: There is tenderness (plantar tenderness, worse with flexion of foot). There is normal range of motion and no swelling.   Lymphadenopathy:        Head (right side): No submental, no submandibular, no tonsillar, no preauricular, no posterior auricular and no occipital adenopathy present.        Head (left side): No submental, no submandibular, no tonsillar, no preauricular, no posterior auricular and no occipital adenopathy present.   Neurological: She is alert.   Skin: Skin is warm, dry and intact. No cyanosis. Nails show no clubbing.       Assessment/Plan   Tia was seen today for foot pain and vaginitis.    Diagnoses and all orders for this visit:    Plantar fasciitis, left  Comments:  handout given with stretching exercises, discussed benefits of heel inserts    Vaginal candidiasis  Comments:  +yeast noted on KOH/wet prep  Orders:  -     fluconazole (DIFLUCAN) 150 MG tablet; Today, may repeat in 3 days if needed    Bacterial vaginosis  Comments:  +WBCs, bacteria noted  Orders:  -     metroNIDAZOLE (METROGEL) 0.75 % vaginal gel; Insert  into the vagina Every Night for 5 days.    Vaginal discharge  -     KOH Prep - Swab, Cervix  -     Wet Prep, Genital - Swab, Vagina  -     POCT pregnancy, urine  -     Cancel: Chlamydia trachomatis, Neisseria gonorrhoeae, PCR - Swab, Vagina; Future  -     Chlamydia trachomatis, Neisseria gonorrhoeae, PCR -  Urine, Urine, Clean Catch; Future  -     Chlamydia trachomatis, Neisseria gonorrhoeae, PCR - Urine, Urine, Clean Catch    Her pregnancy test is negative, will treat for yeast and bacterial vaginosis (G&C tests pending).

## 2018-10-17 LAB
C TRACH RRNA SPEC DONR QL NAA+PROBE: NEGATIVE
N GONORRHOEA DNA SPEC QL NAA+PROBE: NEGATIVE

## 2019-01-20 DIAGNOSIS — Z30.41 ENCOUNTER FOR SURVEILLANCE OF CONTRACEPTIVE PILLS: ICD-10-CM

## 2019-01-21 RX ORDER — NORETHINDRONE ACETATE AND ETHINYL ESTRADIOL 1; 20 MG/1; UG/1
TABLET ORAL
Qty: 84 TABLET | Refills: 2 | Status: SHIPPED | OUTPATIENT
Start: 2019-01-21 | End: 2019-12-19

## 2019-12-19 DIAGNOSIS — Z30.41 ENCOUNTER FOR SURVEILLANCE OF CONTRACEPTIVE PILLS: ICD-10-CM

## 2019-12-19 RX ORDER — NORETHINDRONE ACETATE AND ETHINYL ESTRADIOL 1; 20 MG/1; UG/1
TABLET ORAL
Qty: 84 TABLET | Refills: 2 | Status: SHIPPED | OUTPATIENT
Start: 2019-12-19 | End: 2020-07-22

## 2020-06-29 ENCOUNTER — OFFICE VISIT (OUTPATIENT)
Dept: INTERNAL MEDICINE | Facility: CLINIC | Age: 25
End: 2020-06-29

## 2020-06-29 VITALS
WEIGHT: 120.2 LBS | HEART RATE: 64 BPM | SYSTOLIC BLOOD PRESSURE: 110 MMHG | BODY MASS INDEX: 20.52 KG/M2 | OXYGEN SATURATION: 98 % | DIASTOLIC BLOOD PRESSURE: 72 MMHG | HEIGHT: 64 IN

## 2020-06-29 DIAGNOSIS — Z00.00 ANNUAL PHYSICAL EXAM: Primary | ICD-10-CM

## 2020-06-29 DIAGNOSIS — N76.0 BACTERIAL VAGINOSIS: ICD-10-CM

## 2020-06-29 DIAGNOSIS — M54.2 CERVICALGIA: ICD-10-CM

## 2020-06-29 DIAGNOSIS — B96.89 BACTERIAL VAGINOSIS: ICD-10-CM

## 2020-06-29 PROCEDURE — 99395 PREV VISIT EST AGE 18-39: CPT | Performed by: NURSE PRACTITIONER

## 2020-06-29 RX ORDER — CYCLOBENZAPRINE HCL 5 MG
5 TABLET ORAL 3 TIMES DAILY PRN
Qty: 30 TABLET | Refills: 0 | Status: SHIPPED | OUTPATIENT
Start: 2020-06-29 | End: 2020-12-28

## 2020-06-29 RX ORDER — TRIAMCINOLONE ACETONIDE 0.1 %
PASTE (GRAM) DENTAL
COMMUNITY
Start: 2020-06-11 | End: 2020-12-28

## 2020-06-29 NOTE — PROGRESS NOTES
Anh Schaffer is a 24 y.o. female.     Well Adult Physical   Patient here for a comprehensive physical exam.The patient reports problems - neck pain (prior injury from work- does not want workers comp)        History:  LMP: No LMP recorded.  Last pap date: 2020  Abnormal pap? no  : 0  Para: 0    She is working out some. She is currently furloughed from her job. She is up to date with dental and vision exam. She had recent bilateral nipple piercing (about three weeks ago). 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 She also had test STD panel and negative.     She has been having neck pain for about one year after work's accident. She reports the case is close and would like to discuss in office. She has been going to chiropractor and has noticed mild relief. She is having spasms intermittently. She reports having xray of cervical neck at his office. She will provide copy. Also reports having MRI of cervical spine in the last year (she is unsure where this was performed-she will ask her mother and provide information).     Neck Pain    This is a chronic problem. The current episode started more than 1 year ago. The problem has been gradually worsening (worst in 3 months). The pain is present in the occipital region. Quality: cramping, stiffness  The pain is at a severity of 6/10. The pain is moderate. Exacerbated by: sleeping  The pain is worse during the day. Associated symptoms include headaches (intermittent ). Pertinent negatives include no chest pain, fever, numbness, pain with swallowing, paresis, photophobia, trouble swallowing, visual change or weakness. She has tried chiropractic manipulation (tylenol, ice) for the symptoms. The treatment provided moderate relief.        The following portions of the patient's history were reviewed and updated as appropriate: allergies, current medications, past family history, past medical history, past social  history, past surgical history and problem list.    Review of Systems   Constitutional: Negative for appetite change, chills, fatigue and fever.   HENT: Negative for congestion, ear pain, hearing loss, mouth sores, nosebleeds, postnasal drip, rhinorrhea, sinus pressure, sneezing, sore throat, tinnitus, trouble swallowing and voice change.    Eyes: Negative for photophobia and visual disturbance.   Respiratory: Negative for cough, chest tightness, shortness of breath and wheezing.    Cardiovascular: Negative for chest pain, palpitations and leg swelling.   Gastrointestinal: Negative for abdominal pain, anal bleeding, blood in stool, constipation, diarrhea, nausea and vomiting.   Endocrine: Negative for cold intolerance, heat intolerance, polydipsia, polyphagia and polyuria.   Genitourinary: Positive for vaginal discharge (recurrent bv ). Negative for dysuria, frequency, hematuria and urgency.   Musculoskeletal: Positive for neck pain and neck stiffness. Negative for arthralgias, back pain, gait problem, joint swelling and myalgias.   Skin: Negative for color change and rash.        NEGATIVE BREAST MASS, BREAST PAIN, NIPPLE DISCHARGE, SKIN CHANGES, OR LUMP IN ARMPIT   Neurological: Positive for headaches (intermittent ). Negative for dizziness, tremors, seizures, syncope, speech difficulty, weakness and numbness.   Hematological: Negative for adenopathy. Does not bruise/bleed easily.   Psychiatric/Behavioral: Negative for behavioral problems, confusion, decreased concentration, sleep disturbance and suicidal ideas. The patient is not nervous/anxious.        Objective   Physical Exam   Constitutional: She appears well-developed and well-nourished.   HENT:   Right Ear: Hearing, tympanic membrane, external ear and ear canal normal.   Left Ear: Hearing, tympanic membrane, external ear and ear canal normal.   Nose: Nose normal. Right sinus exhibits no maxillary sinus tenderness and no frontal sinus tenderness. Left sinus  exhibits no maxillary sinus tenderness and no frontal sinus tenderness.   Mouth/Throat: Uvula is midline, oropharynx is clear and moist and mucous membranes are normal. No tonsillar exudate.   Eyes: Pupils are equal, round, and reactive to light. Conjunctivae, EOM and lids are normal.   Neck: Trachea normal. Neck supple. No JVD present. Carotid bruit is not present. No tracheal deviation present. No thyroid mass and no thyromegaly present.   Cardiovascular: Normal rate, regular rhythm, S1 normal and S2 normal. Exam reveals no gallop and no friction rub.   No murmur heard.  Pulses:       Carotid pulses are 2+ on the right side, and 2+ on the left side.       Radial pulses are 2+ on the right side, and 2+ on the left side.        Dorsalis pedis pulses are 2+ on the right side, and 2+ on the left side.        Posterior tibial pulses are 2+ on the right side, and 2+ on the left side.   No pedal edema    Pulmonary/Chest: Effort normal and breath sounds normal. Chest wall is not dull to percussion. Right breast exhibits no inverted nipple, no mass, no skin change and no tenderness. Left breast exhibits no inverted nipple, no mass, no skin change and no tenderness.   Bilateral nipple piercing    Abdominal: Soft. Normal aorta and bowel sounds are normal. She exhibits no abdominal bruit. There is no hepatosplenomegaly. There is no tenderness. There is no rebound and no guarding. No hernia.   Musculoskeletal: Normal range of motion. She exhibits no edema.        Right shoulder: She exhibits normal range of motion, no tenderness, no pain and no spasm.        Left shoulder: She exhibits normal range of motion, no tenderness, no pain and no spasm.        Cervical back: She exhibits tenderness. She exhibits normal range of motion, no pain and no spasm.        Back:    (-) SLR   Equal  strength    Lymphadenopathy:        Head (right side): No submental, no submandibular, no tonsillar, no preauricular, no posterior auricular  and no occipital adenopathy present.        Head (left side): No submental, no submandibular, no tonsillar, no preauricular, no posterior auricular and no occipital adenopathy present.     She has no cervical adenopathy.     She has no axillary adenopathy.        Right: No supraclavicular adenopathy present.        Left: No supraclavicular adenopathy present.   Neurological: She is alert. She has normal strength. No cranial nerve deficit or sensory deficit.   Reflex Scores:       Patellar reflexes are 2+ on the right side and 2+ on the left side.  Skin: Skin is warm and dry. No rash noted.   Psychiatric: She has a normal mood and affect. Her speech is normal and behavior is normal. Judgment and thought content normal. Cognition and memory are normal.   Nursing note and vitals reviewed.      Assessment/Plan   Tia was seen today for annual exam.    Diagnoses and all orders for this visit:    Annual physical exam  -     TSH Rfx On Abnormal To Free T4; Future  -     Lipid Panel; Future  -     Comprehensive Metabolic Panel; Future  -     CBC & Differential; Future  -     Urinalysis With Microscopic If Indicated (No Culture) - Urine, Clean Catch; Future    Cervicalgia  Comments:  needs heat and massage, if no better with PT f/u in office   Orders:  -     cyclobenzaprine (FLEXERIL) 5 MG tablet; Take 1 tablet by mouth 3 (Three) Times a Day As Needed for Muscle Spasms.  -     Ambulatory Referral to Physical Therapy Evaluate and treat    Bacterial vaginosis  Comments:  recurrent will refer to GYN   Orders:  -     Ambulatory Referral to Gynecology      She will reestablish with GYN for upcoming pap and recurrent bacterial vaginosis (currently spotting)  Discussed need for monthly breast exams  Discussed need for routine cardio (30-45 minutes at least  3-4 times a week and healthy diet)  She will return for fasting labs   She will provide information for MRI of cervical spine   She was advised to drowsy precautions with muscle  relaxer

## 2020-07-01 ENCOUNTER — LAB (OUTPATIENT)
Dept: INTERNAL MEDICINE | Facility: CLINIC | Age: 25
End: 2020-07-01

## 2020-07-01 DIAGNOSIS — Z00.00 ANNUAL PHYSICAL EXAM: ICD-10-CM

## 2020-07-01 LAB
ALBUMIN SERPL-MCNC: 4.6 G/DL (ref 3.5–5.2)
ALBUMIN/GLOB SERPL: 1.8 G/DL
ALP SERPL-CCNC: 54 U/L (ref 39–117)
ALT SERPL-CCNC: 8 U/L (ref 1–33)
APPEARANCE UR: CLEAR
AST SERPL-CCNC: 12 U/L (ref 1–32)
BASOPHILS # BLD AUTO: 0.06 10*3/MM3 (ref 0–0.2)
BASOPHILS NFR BLD AUTO: 1 % (ref 0–1.5)
BILIRUB SERPL-MCNC: 0.6 MG/DL (ref 0.2–1.2)
BILIRUB UR QL STRIP: NEGATIVE
BUN SERPL-MCNC: 10 MG/DL (ref 6–20)
BUN/CREAT SERPL: 11.4 (ref 7–25)
CALCIUM SERPL-MCNC: 9.8 MG/DL (ref 8.6–10.5)
CHLORIDE SERPL-SCNC: 104 MMOL/L (ref 98–107)
CHOLEST SERPL-MCNC: 143 MG/DL (ref 0–200)
CO2 SERPL-SCNC: 28.5 MMOL/L (ref 22–29)
COLOR UR: YELLOW
CREAT SERPL-MCNC: 0.88 MG/DL (ref 0.57–1)
EOSINOPHIL # BLD AUTO: 0.26 10*3/MM3 (ref 0–0.4)
EOSINOPHIL NFR BLD AUTO: 4.4 % (ref 0.3–6.2)
ERYTHROCYTE [DISTWIDTH] IN BLOOD BY AUTOMATED COUNT: 11.8 % (ref 12.3–15.4)
GLOBULIN SER CALC-MCNC: 2.5 GM/DL
GLUCOSE SERPL-MCNC: 86 MG/DL (ref 65–99)
GLUCOSE UR QL: NEGATIVE
HCT VFR BLD AUTO: 40.9 % (ref 34–46.6)
HDLC SERPL-MCNC: 66 MG/DL (ref 40–60)
HGB BLD-MCNC: 13.5 G/DL (ref 12–15.9)
HGB UR QL STRIP: NEGATIVE
IMM GRANULOCYTES # BLD AUTO: 0.01 10*3/MM3 (ref 0–0.05)
IMM GRANULOCYTES NFR BLD AUTO: 0.2 % (ref 0–0.5)
KETONES UR QL STRIP: NEGATIVE
LDLC SERPL CALC-MCNC: 65 MG/DL (ref 0–100)
LEUKOCYTE ESTERASE UR QL STRIP: NEGATIVE
LYMPHOCYTES # BLD AUTO: 2.31 10*3/MM3 (ref 0.7–3.1)
LYMPHOCYTES NFR BLD AUTO: 39 % (ref 19.6–45.3)
MCH RBC QN AUTO: 29.9 PG (ref 26.6–33)
MCHC RBC AUTO-ENTMCNC: 33 G/DL (ref 31.5–35.7)
MCV RBC AUTO: 90.7 FL (ref 79–97)
MONOCYTES # BLD AUTO: 0.46 10*3/MM3 (ref 0.1–0.9)
MONOCYTES NFR BLD AUTO: 7.8 % (ref 5–12)
NEUTROPHILS # BLD AUTO: 2.82 10*3/MM3 (ref 1.7–7)
NEUTROPHILS NFR BLD AUTO: 47.6 % (ref 42.7–76)
NITRITE UR QL STRIP: NEGATIVE
NRBC BLD AUTO-RTO: 0.2 /100 WBC (ref 0–0.2)
PH UR STRIP: 5.5 [PH] (ref 5–8)
PLATELET # BLD AUTO: 326 10*3/MM3 (ref 140–450)
POTASSIUM SERPL-SCNC: 4.2 MMOL/L (ref 3.5–5.2)
PROT SERPL-MCNC: 7.1 G/DL (ref 6–8.5)
PROT UR QL STRIP: NEGATIVE
RBC # BLD AUTO: 4.51 10*6/MM3 (ref 3.77–5.28)
SODIUM SERPL-SCNC: 140 MMOL/L (ref 136–145)
SP GR UR: 1.02 (ref 1–1.03)
TRIGL SERPL-MCNC: 58 MG/DL (ref 0–150)
TSH SERPL DL<=0.005 MIU/L-ACNC: 2.4 UIU/ML (ref 0.27–4.2)
UROBILINOGEN UR STRIP-MCNC: NORMAL MG/DL
VLDLC SERPL CALC-MCNC: 11.6 MG/DL
WBC # BLD AUTO: 5.92 10*3/MM3 (ref 3.4–10.8)

## 2020-07-09 ENCOUNTER — OFFICE VISIT (OUTPATIENT)
Dept: OBSTETRICS AND GYNECOLOGY | Age: 25
End: 2020-07-09

## 2020-07-09 VITALS
HEIGHT: 64 IN | WEIGHT: 116.8 LBS | DIASTOLIC BLOOD PRESSURE: 56 MMHG | SYSTOLIC BLOOD PRESSURE: 94 MMHG | BODY MASS INDEX: 19.94 KG/M2

## 2020-07-09 DIAGNOSIS — Z11.3 SCREEN FOR STD (SEXUALLY TRANSMITTED DISEASE): ICD-10-CM

## 2020-07-09 DIAGNOSIS — Z87.440 HISTORY OF RECURRENT UTIS: ICD-10-CM

## 2020-07-09 DIAGNOSIS — B96.89 BV (BACTERIAL VAGINOSIS): Primary | ICD-10-CM

## 2020-07-09 DIAGNOSIS — N76.0 BV (BACTERIAL VAGINOSIS): Primary | ICD-10-CM

## 2020-07-09 DIAGNOSIS — R87.612 LOW GRADE SQUAMOUS INTRAEPITHELIAL LESION ON CYTOLOGIC SMEAR OF CERVIX (LGSIL): ICD-10-CM

## 2020-07-09 PROCEDURE — 99213 OFFICE O/P EST LOW 20 MIN: CPT | Performed by: OBSTETRICS & GYNECOLOGY

## 2020-07-09 RX ORDER — TINIDAZOLE 500 MG/1
1 TABLET ORAL DAILY
Qty: 10 TABLET | Refills: 2 | Status: SHIPPED | OUTPATIENT
Start: 2020-07-09 | End: 2020-07-14

## 2020-07-09 RX ORDER — CLINDAMYCIN PHOSPHATE 100 MG/5G
5 CREAM VAGINAL ONCE
Qty: 5 G | Refills: 2 | Status: SHIPPED | OUTPATIENT
Start: 2020-07-09 | End: 2020-07-09

## 2020-07-09 RX ORDER — NITROFURANTOIN 25; 75 MG/1; MG/1
CAPSULE ORAL
Qty: 30 CAPSULE | Refills: 2 | Status: SHIPPED | OUTPATIENT
Start: 2020-07-09 | End: 2020-12-28

## 2020-07-09 NOTE — PROGRESS NOTES
"Subjective     Chief Complaint   Patient presents with   • Follow-up     Gyn follow up, Recurring BV, Last AE 18, Last pap 01/10/18 LGSIL-encompassing HPV/Mild dysplasia, MIKE 1       Tia Sarbjit is a 24 y.o.  whose LMP is Patient's last menstrual period was 2020 (approximate). presents with c/o recurrent BV. She c/o some vaginal discharge with odor. 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 She has had medical care elsewhere since last seen her. She c/o recurrent BV with current symptoms. She has taken several rounds of antibiotics which gives her relief but it does recur. She also c/o frequent UTIs that she thinks occur after intercourse. She does not have UTI symptoms currently. She has had abnormal paps and states they are always mildly abnormal. She takes ocps for contraception (Junel 1/20). She had some recent btb. She declines a pregnancy test as she has not been sexually active for several months now.      No Additional Complaints Reported    The following portions of the patient's history were reviewed and updated as appropriate:vital signs, allergies, current medications, past family history, past medical history, past social history, past surgical history and problem list      Review of Systems   A comprehensive review of systems was negative except for: Gastrointestinal: positive for intermenstrual bleeding once, vaginal discharge with odor    Objective      BP 94/56   Ht 162.6 cm (64\")   Wt 53 kg (116 lb 12.8 oz)   LMP 2020 (Approximate)   Breastfeeding No   BMI 20.05 kg/m²     Physical Exam    General:   alert, appears stated age and no distress   Heart:    Lungs:    Breast:    Neck:    Abdomen:    CVA: Not performed today   Pelvis: External genitalia: normal general appearance  Urinary system: urethral meatus normal  Vaginal: normal mucosa without prolapse or lesions  Cervix: normal appearance and thin prep PAP " obtained   Extremities: Extremities normal, atraumatic, no cyanosis or edema   Neurologic: negative   Psychiatric: Normal affect, judgement, and mood       Lab Review   Labs: No data reviewed     Imaging   No data reviewed    Assessment/Plan     ASSESSMENT  1. BV (bacterial vaginosis)    2. History of recurrent UTIs    3. Screen for STD (sexually transmitted disease)    4. Low grade squamous intraepithelial lesion on cytologic smear of cervix (LGSIL)        PLAN  1.   Orders Placed This Encounter   Procedures   • NuSwab Vaginitis (VG) - Swab, Vagina       2. Medications prescribed this encounter:        New Medications Ordered This Visit   Medications   • nitrofurantoin, macrocrystal-monohydrate, (Macrobid) 100 MG capsule     Si tab po after intercourse     Dispense:  30 capsule     Refill:  2   • CLINDESSE 2 % vaginal cream     Sig: Apply 1 application topically to the appropriate area as directed 1 (One) Time for 1 dose.     Dispense:  5 g     Refill:  2   • tinidazole (TINDAMAX) 500 MG tablet     Sig: Take 2 tablets by mouth Daily for 5 doses.     Dispense:  10 tablet     Refill:  2       3. Recommend increased water intake and daily cranberry chews to prevent UTIs. Will start post-coital antibiotics.  4. Pt given sample of 1 time metronidazole vaginal gel (Nuvessa)    Follow up: 6 mo for repeat pap and annual    Sonya Bernal MD  2020

## 2020-07-14 ENCOUNTER — TREATMENT (OUTPATIENT)
Dept: PHYSICAL THERAPY | Facility: CLINIC | Age: 25
End: 2020-07-14

## 2020-07-14 DIAGNOSIS — M54.2 CERVICALGIA: Primary | ICD-10-CM

## 2020-07-14 LAB
C TRACH RRNA CVX QL NAA+PROBE: NEGATIVE
CONV .: NORMAL
CYTOLOGIST CVX/VAG CYTO: NORMAL
CYTOLOGY CVX/VAG DOC CYTO: NORMAL
CYTOLOGY CVX/VAG DOC THIN PREP: NORMAL
DX ICD CODE: NORMAL
HIV 1 & 2 AB SER-IMP: NORMAL
N GONORRHOEA RRNA CVX QL NAA+PROBE: NEGATIVE
OTHER STN SPEC: NORMAL
STAT OF ADQ CVX/VAG CYTO-IMP: NORMAL

## 2020-07-14 PROCEDURE — G0283 ELEC STIM OTHER THAN WOUND: HCPCS | Performed by: PHYSICAL THERAPIST

## 2020-07-14 PROCEDURE — 97161 PT EVAL LOW COMPLEX 20 MIN: CPT | Performed by: PHYSICAL THERAPIST

## 2020-07-14 NOTE — PROGRESS NOTES
Physical Therapy Initial Evaluation and Plan of Care    Patient: Delmy Schaffer   : 1995  Diagnosis/ICD-10 Code:  Cervicalgia [M54.2]  Referring practitioner: FABBY Wright    Subjective Evaluation    History of Present Illness  Mechanism of injury: Pt reports onset of neck pain in 2019 while at work from heavy lifting. Reports pain in B UT, R>L. Reports previous treatments include PT and chiropractor with no significant progress. Denies N&T in hands. Reports she get headaches frequently.       Patient Occupation: work on cruise ships  Quality of life: good    Pain  Current pain ratin  At best pain rating: 3  At worst pain ratin  Location: B UT  Quality: tight and discomfort  Relieving factors: rest, ice and medications  Aggravating factors: movement, lifting, sleeping and outstretched reach  Progression: no change    Hand dominance: right    Treatments  Previous treatment: chiropractic, physical therapy and medication  Current treatment: physical therapy  Patient Goals  Patient goals for therapy: decreased pain, increased strength, return to sport/leisure activities and increased motion             Objective          Static Posture     Head  Forward.    Shoulders  Rounded.    Postural Observations  Seated posture: fair        Active Range of Motion   Cervical/Thoracic Spine   Cervical    Flexion: 40 degrees   Extension: 35 degrees   Left lateral flexion: 25 degrees   Right lateral flexion: 30 degrees   Left rotation: 50 degrees   Right rotation: 50 degrees   Left Shoulder   Normal active range of motion    Right Shoulder   Normal active range of motion    Strength/Myotome Testing     Left Shoulder     Planes of Motion   Flexion: 5   Abduction: 5   External rotation at 0°: 5   Internal rotation at 0°: 5     Isolated Muscles   Lower trapezius: 4-   Middle trapezius: 4-     Right Shoulder     Planes of Motion   Flexion: 5   Abduction: 5   External rotation at 0°: 5   Internal rotation at 0°: 5      Isolated Muscles   Lower trapezius: 4-   Middle trapezius: 4-     Left Elbow   Flexion: 5  Extension: 5    Right Elbow   Flexion: 5  Extension: 5    Functional Assessment     Comments  NDI: 21          Assessment & Plan     Assessment  Impairments: abnormal or restricted ROM, activity intolerance, impaired physical strength, lacks appropriate home exercise program and pain with function  Assessment details: Pt presents to PT with symptoms consistent with cervicalgia, poor posture, scapulothoracic weakness.   Pt would benefit from skilled PT intervention to address the deficits noted.     Prognosis: good  Functional Limitations: carrying objects, lifting, sleeping and uncomfortable because of pain  Goals  Plan Goals: SHORT TERM GOALS:  Time for Goal Achievement: 8 visits   1.  Patient to be compliant with HEP  2.  Pt to report increased ease to drive and exercise with less pain.  3.  Increased cervical and thoracic segmental mobility to allow for increased ease with driving and ADL's.  4.  Pt. to be independent with CT stabilization exercises to allow for improved posture while in clinic with fatiguing exercises.    LONG TERM GOALS: Time for Goal Achievement: 16 visits  1.  Pt to score < 15% perceived disability on Neck Index  2.  Pain level < 3/10 at worse with all activities  3.  Increased cervical AROM to WFL to allow for driving and household tasks without restrictions.  4.  Pt able to perform sports, drive, lift and daily activities without complaints of weakness or pain limiting function.        Plan  Therapy options: will be seen for skilled physical therapy services  Planned modality interventions: cryotherapy, electrical stimulation/Russian stimulation, TENS, thermotherapy (hydrocollator packs), ultrasound and traction  Other planned modality interventions: Dry Needling  Planned therapy interventions: manual therapy, home exercise program, functional ROM exercises, flexibility, neuromuscular  re-education, postural training, soft tissue mobilization, spinal/joint mobilization, strengthening, stretching, body mechanics training and therapeutic activities  Frequency: 2x week  Duration in visits: 16  Treatment plan discussed with: patient        Manual Therapy:          mins  02327;  Therapeutic Exercise:          mins  45873;     Neuromuscular Katja:         mins  77154;    Therapeutic Activity:           mins  11447;     Gait Training:            mins  11780;     Ultrasound:           mins  85807;    Electrical Stimulation:     15     mins  14391 ( );  Dry Needling           mins self-pay  Traction           mins 27725  Canalith Repositioning         mins 51045      Timed Treatment:   15   mins   Total Treatment:   45     mins    PT SIGNATURE: Lety Clark PT   [place] Lic #[id number]    DATE TREATMENT INITIATED: 7/14/2020    Initial Certification  Certification Period: 10/12/2020  I certify that the therapy services are furnished while this patient is under my care.  The services outlined above are required by this patient, and will be reviewed every 90 days.     PHYSICIAN: Aliyah Castellanos APRN      DATE:     Please sign and return via fax to [phone].. Thank you, [facility].

## 2020-07-15 ENCOUNTER — TELEPHONE (OUTPATIENT)
Dept: OBSTETRICS AND GYNECOLOGY | Age: 25
End: 2020-07-15

## 2020-07-17 ENCOUNTER — TREATMENT (OUTPATIENT)
Dept: PHYSICAL THERAPY | Facility: CLINIC | Age: 25
End: 2020-07-17

## 2020-07-17 DIAGNOSIS — M54.2 CERVICALGIA: Primary | ICD-10-CM

## 2020-07-17 PROCEDURE — 97530 THERAPEUTIC ACTIVITIES: CPT | Performed by: PHYSICAL THERAPIST

## 2020-07-17 PROCEDURE — 97110 THERAPEUTIC EXERCISES: CPT | Performed by: PHYSICAL THERAPIST

## 2020-07-17 NOTE — PROGRESS NOTES
Physical Therapy Daily Progress Note  Visit: 2    Subjective Tia Schaffer reports: she went for a short jog and trying to exercise more    Objective   See Exercise, Manual, and Modality Logs for complete treatment.     Assessment/Plan: Tolerated new exercises well today.     Manual Therapy:          mins  24476;  Therapeutic Exercise:  30        mins  39508;     Neuromuscular Katja:         mins  72705;    Therapeutic Activity:    10       mins  47084;     Gait Training:            mins  66192;     Ultrasound:           mins  88703;    Electrical Stimulation:          mins  38280 ( );  Dry Needling           mins self-pay  Traction           mins 82636  Canalith Repositioning         mins 47757      Timed Treatment:  40    mins   Total Treatment:     40   mins    Lety Clark, PT  [place] License #: [id number]    Physical Therapist

## 2020-07-20 LAB
A VAGINAE DNA VAG QL NAA+PROBE: NORMAL SCORE
BVAB2 DNA VAG QL NAA+PROBE: NORMAL SCORE
C ALBICANS DNA VAG QL NAA+PROBE: NEGATIVE
C GLABRATA DNA VAG QL NAA+PROBE: NEGATIVE
MEGA1 DNA VAG QL NAA+PROBE: NORMAL SCORE
T VAGINALIS DNA VAG QL NAA+PROBE: NEGATIVE

## 2020-07-22 ENCOUNTER — TELEPHONE (OUTPATIENT)
Dept: OBSTETRICS AND GYNECOLOGY | Age: 25
End: 2020-07-22

## 2020-07-22 DIAGNOSIS — Z30.41 ENCOUNTER FOR SURVEILLANCE OF CONTRACEPTIVE PILLS: ICD-10-CM

## 2020-07-22 RX ORDER — NORETHINDRONE ACETATE AND ETHINYL ESTRADIOL 1; 20 MG/1; UG/1
TABLET ORAL
Qty: 84 TABLET | Refills: 2 | Status: SHIPPED | OUTPATIENT
Start: 2020-07-22 | End: 2021-09-06

## 2020-07-29 ENCOUNTER — TREATMENT (OUTPATIENT)
Dept: PHYSICAL THERAPY | Facility: CLINIC | Age: 25
End: 2020-07-29

## 2020-07-29 DIAGNOSIS — M54.2 CERVICALGIA: Primary | ICD-10-CM

## 2020-07-29 PROCEDURE — 97110 THERAPEUTIC EXERCISES: CPT | Performed by: PHYSICAL THERAPIST

## 2020-07-29 NOTE — PROGRESS NOTES
Physical Therapy Daily Progress Note  Visit: 3    Subjective Tia Schaffer reports: compliance with HEP    Objective   See Exercise, Manual, and Modality Logs for complete treatment.     Assessment/Plan: Compliant/cooperative with current rehab efforts.  Benefits from verbal/tactile cues to ensure correct exercise performance/technique, hold time and position. Plan details: Progress ROM / strengthening / stabilization / functional activity as tolerated     Manual Therapy:          mins  75099;  Therapeutic Exercise:   45       mins  78592;     Neuromuscular Katja:         mins  44358;    Therapeutic Activity:           mins  26856;     Gait Training:            mins  32087;     Ultrasound:           mins  68343;    Electrical Stimulation:          mins  69600 ( );  Dry Needling           mins self-pay  Traction           mins 38892  Canalith Repositioning         mins 98040      Timed Treatment:  45    mins   Total Treatment:   45     mins    JEF Coreas License #: [id number]    Physical Therapist

## 2020-07-31 ENCOUNTER — TREATMENT (OUTPATIENT)
Dept: PHYSICAL THERAPY | Facility: CLINIC | Age: 25
End: 2020-07-31

## 2020-07-31 DIAGNOSIS — M54.2 CERVICALGIA: Primary | ICD-10-CM

## 2020-07-31 PROCEDURE — 97110 THERAPEUTIC EXERCISES: CPT | Performed by: PHYSICAL THERAPIST

## 2020-07-31 NOTE — PROGRESS NOTES
Physical Therapy Daily Progress Note  Visit: 4    Subjective Tia Schaffer reports: she has had less headaches lately but had one on Wednesday evening    Objective   See Exercise, Manual, and Modality Logs for complete treatment.     Assessment/Plan: Compliant/cooperative with current rehab efforts.  Benefits from verbal/tactile cues to ensure correct exercise performance/technique, hold time and position. Plan details: Progress ROM / strengthening / stabilization / functional activity as tolerated     Manual Therapy:          mins  05426;  Therapeutic Exercise:   40       mins  66980;     Neuromuscular Katja:         mins  29978;    Therapeutic Activity:           mins  85783;     Gait Training:            mins  96235;     Ultrasound:           mins  03766;    Electrical Stimulation:          mins  47381 ( );  Dry Needling           mins self-pay  Traction           mins 05778  Canalith Repositioning         mins 28391      Timed Treatment:   40   mins   Total Treatment:     40   mins    JEF Coreas License #: [id number]    Physical Therapist

## 2020-08-03 ENCOUNTER — TREATMENT (OUTPATIENT)
Dept: PHYSICAL THERAPY | Facility: CLINIC | Age: 25
End: 2020-08-03

## 2020-08-03 DIAGNOSIS — M54.2 CERVICALGIA: Primary | ICD-10-CM

## 2020-08-03 PROCEDURE — 97110 THERAPEUTIC EXERCISES: CPT | Performed by: PHYSICAL THERAPIST

## 2020-08-03 NOTE — PROGRESS NOTES
Physical Therapy Daily Progress Note  Visit: 5    Subjective Tia Schaffer reports:  No recent headaches     Objective   See Exercise, Manual, and Modality Logs for complete treatment.     Assessment/Plan: Compliant/cooperative with current rehab efforts.  Benefits from verbal/tactile cues to ensure correct exercise performance/technique, hold time and position. Plan details: Progress ROM / strengthening / stabilization / functional activity as tolerated     Manual Therapy:          mins  02281;  Therapeutic Exercise:   40       mins  17548;     Neuromuscular Katja:         mins  36936;    Therapeutic Activity:           mins  97502;     Gait Training:            mins  97020;     Ultrasound:           mins  06763;    Electrical Stimulation:          mins  61396 ( );  Dry Needling           mins self-pay  Traction           mins 73549  Canalith Repositioning         mins 28721      Timed Treatment: 40     mins   Total Treatment:   40     mins    Lety Clark PT  [place] License #: [id number]    Physical Therapist

## 2020-08-05 ENCOUNTER — TELEPHONE (OUTPATIENT)
Dept: OBSTETRICS AND GYNECOLOGY | Age: 25
End: 2020-08-05

## 2020-08-06 ENCOUNTER — TREATMENT (OUTPATIENT)
Dept: PHYSICAL THERAPY | Facility: CLINIC | Age: 25
End: 2020-08-06

## 2020-08-06 DIAGNOSIS — M54.2 CERVICALGIA: Primary | ICD-10-CM

## 2020-08-06 PROCEDURE — 97110 THERAPEUTIC EXERCISES: CPT | Performed by: PHYSICAL THERAPIST

## 2020-08-06 NOTE — PROGRESS NOTES
Physical Therapy Daily Progress Note  Visit: 6    Subjective Tia Schaffer reports: compliance with HEP     Objective   See Exercise, Manual, and Modality Logs for complete treatment.     Assessment/Plan: Compliant/cooperative with current rehab efforts.  Benefits from verbal/tactile cues to ensure correct exercise performance/technique, hold time and position. Plan details: Progress ROM / strengthening / stabilization / functional activity as tolerated     Manual Therapy:          mins  50379;  Therapeutic Exercise:  38       mins  78799;     Neuromuscular Katja:         mins  86831;    Therapeutic Activity:           mins  99859;     Gait Training:            mins  23516;     Ultrasound:           mins  82560;    Electrical Stimulation:          mins  16322 ( );  Dry Needling           mins self-pay  Traction           mins 87675  Canalith Repositioning         mins 69621      Timed Treatment:   38   mins   Total Treatment:     38   mins    Lety Clark PT  [place] License #: [id number]    Physical Therapist

## 2020-08-10 ENCOUNTER — TREATMENT (OUTPATIENT)
Dept: PHYSICAL THERAPY | Facility: CLINIC | Age: 25
End: 2020-08-10

## 2020-08-10 DIAGNOSIS — M54.2 CERVICALGIA: Primary | ICD-10-CM

## 2020-08-10 PROCEDURE — 97110 THERAPEUTIC EXERCISES: CPT | Performed by: PHYSICAL THERAPIST

## 2020-08-10 NOTE — PROGRESS NOTES
Physical Therapy Daily Progress Note  Visit: 7    Subjective Tia Schaffer reports: she joined a gym to continue with her exercises.     Objective   See Exercise, Manual, and Modality Logs for complete treatment. Progressed resistance used with exercises.     Assessment/Plan: Compliant/cooperative with current rehab efforts.  Benefits from verbal/tactile cues to ensure correct exercise performance/technique, hold time and position. Plan details: Progress ROM / strengthening / stabilization / functional activity as tolerated     Manual Therapy:          mins  79955;  Therapeutic Exercise:     53     mins  95374;     Neuromuscular Katja:         mins  07977;    Therapeutic Activity:           mins  44867;     Gait Training:            mins  17984;     Ultrasound:           mins  56565;    Electrical Stimulation:          mins  69586 ( );  Dry Needling           mins self-pay  Traction           mins 52615  Canalith Repositioning         mins 10733      Timed Treatment: 53    mins   Total Treatment:     53   mins    JEF Coreas License #: [id number]    Physical Therapist

## 2020-08-13 ENCOUNTER — TREATMENT (OUTPATIENT)
Dept: PHYSICAL THERAPY | Facility: CLINIC | Age: 25
End: 2020-08-13

## 2020-08-13 DIAGNOSIS — M54.2 CERVICALGIA: Primary | ICD-10-CM

## 2020-08-13 PROCEDURE — DRYNDL PR CUSTOM DRY NEEDLING SELF PAY: Performed by: PHYSICAL THERAPIST

## 2020-08-13 PROCEDURE — 97110 THERAPEUTIC EXERCISES: CPT | Performed by: PHYSICAL THERAPIST

## 2020-08-13 NOTE — PROGRESS NOTES
Physical Therapy Daily Progress Note  Visit: 8    Subjective Tia Schaffer reports: Her neck is doing better and she joined a gym to continue with her exercises. Reports she wants dry needling today.     Objective   See Exercise, Manual, and Modality Logs for complete treatment.  NDI score: 10  (21 on initial evaluation)  After reviewing all risk of dry needling (including pneumothorax, bruising, infection, nerve injury, and soreness), written informed consent was obtained.  Manual palpation and assessment performed before, during and after dry needling session.  Clean needle technique observed at all times, precautions for lung fields, neurovascular structures observed.   Dry needling performed to B UT.   Assessment/Plan: Tolerated dry needling treatment well without adverse reaction. Pt is I with HEP and will hold PT for now for pt to continue with HEP and follow up as needed.     Manual Therapy:          mins  25142;  Therapeutic Exercise:    45      mins  75542;     Neuromuscular Katja:         mins  50019;    Therapeutic Activity:           mins  25418;     Gait Training:            mins  77841;     Ultrasound:           mins  65825;    Electrical Stimulation:          mins  34294 ( );  Dry Needling     15      mins self-pay  Traction           mins 85878  Canalith Repositioning         mins 96588      Timed Treatment: 45     mins   Total Treatment:   70     mins    Lety Clark, JEF  [place] License #: [id number]    Physical Therapist

## 2020-09-10 ENCOUNTER — TREATMENT (OUTPATIENT)
Dept: PHYSICAL THERAPY | Facility: CLINIC | Age: 25
End: 2020-09-10

## 2020-09-10 DIAGNOSIS — M54.2 CERVICALGIA: Primary | ICD-10-CM

## 2020-09-10 PROCEDURE — DRYNDL PR CUSTOM DRY NEEDLING SELF PAY: Performed by: PHYSICAL THERAPIST

## 2020-09-10 NOTE — PROGRESS NOTES
Physical Therapy Daily Progress Note  Visit: 9    Subjective Tia Schaffer reports: here for dry needling treatment only today     Objective   See Exercise, Manual, and Modality Logs for complete treatment. After reviewing all risk of dry needling (including pneumothorax, bruising, infection, nerve injury, and soreness), written informed consent was obtained.  Manual palpation and assessment performed before, during and after dry needling session.  Clean needle technique observed at all times, precautions for lung fields, neurovascular structures observed.       Assessment/Plan: good response to treatment. Continue as needed.     Manual Therapy:         mins  48525;  Therapeutic Exercise:          mins  94161;     Neuromuscular Katja:         mins  02327;    Therapeutic Activity:           mins  96603;     Gait Training:            mins  30103;     Ultrasound:           mins  62675;    Electrical Stimulation:          mins  48287 ( );  Dry Needling     15      mins self-pay  Traction           mins 96020  Canalith Repositioning         mins 05984      Timed Treatment:  15    mins   Total Treatment:   30     mins    Lety Clark PT  [place] License #: [id number]    Physical Therapist

## 2020-10-09 ENCOUNTER — TREATMENT (OUTPATIENT)
Dept: PHYSICAL THERAPY | Facility: CLINIC | Age: 25
End: 2020-10-09

## 2020-10-09 DIAGNOSIS — M54.2 CERVICALGIA: Primary | ICD-10-CM

## 2020-10-09 PROCEDURE — DRYNDL PR CUSTOM DRY NEEDLING SELF PAY: Performed by: PHYSICAL THERAPIST

## 2020-10-09 NOTE — PROGRESS NOTES
Physical Therapy Daily Progress Note-Dry Needling      Patient: Delmy Schaffer   : 1995  Treatment Diagnosis:     ICD-10-CM ICD-9-CM   1. Cervicalgia  M54.2 723.1     Referring practitioner: No ref. provider found  Date of Initial Visit: Type: THERAPY  Noted: 2020  Today's Date: 10/9/2020  Patient seen for 10 sessions         Delmy Schaffer reports:     Subjective   Patient reports her neck and shoulder are very tense and sore.  States she had a chiropractor treatment last week and it was painful getting adjustments on her neck.    Objective          Palpation   Left   Hypertonic in the cervical paraspinals, levator scapulae, rhomboids and upper trapezius.   Tenderness of the cervical paraspinals, levator scapulae, rhomboids and upper trapezius.   Trigger point to levator scapulae, rhomboids and upper trapezius.     Right   Hypertonic in the cervical paraspinals, levator scapulae, rhomboids and upper trapezius. Tenderness of the cervical paraspinals, levator scapulae, rhomboids and upper trapezius.   Trigger point to levator scapulae, rhomboids and upper trapezius.       See Exercise, Manual, and Modality Logs for complete treatment.       Assessment/Plan  Soft tissue was assessed at cervical/UQ. PT noted point tenderness as well as palpable trigger points within the muslce tissue. On this date patient stated that they would like to undergo a dry needling procedure for the soft tissue dysfunction. Patient was educated on the procedure for dry needling and consent waver was signed. Patient was informed of the risks, possible adverse effects, along with the benefits of DN. Patient had positive response to treatment with active twitch response achieved and decreased muscle hypertonicity.               Timed:  Manual Therapy:         mins  20317;  Therapeutic Exercise:         mins  46934;     Neuromuscular Katja:        mins  50703;    Therapeutic Activity:          mins  05064;     Gait Training:           mins   32845;     Ultrasound:          mins  73401;    Iontophoresis:          mins  06344;  Dry Needlin     mins ;    Untimed:  Electrical Stimulation:         mins  16374 ( );  Mechanical Traction:         mins  66763;   Canalith Repositioning:        mins  78901;    Timed Treatment:   20   mins   Total Treatment:     30   mins    Natividad Dickinson PT  Physical Therapist

## 2020-10-15 ENCOUNTER — TELEPHONE (OUTPATIENT)
Dept: INTERNAL MEDICINE | Facility: CLINIC | Age: 25
End: 2020-10-15

## 2020-10-15 NOTE — TELEPHONE ENCOUNTER
----- Message from FABBY Wright sent at 7/1/2020  2:30 PM EDT -----  Regarding: RE: medical notes  Ok thank   ----- Message -----  From: Sadia Centeno LPN  Sent: 7/1/2020   2:17 PM EDT  To: FABBY Wright  Subject: RE: medical notes                                Pt said she would get them before she goes to PT on the 14th and ask the physical therapy office to fax to our clinic fax.     ----- Message -----  From: Aliyah Castellanos APRN  Sent: 7/1/2020   2:12 PM EDT  To: Sadia Centeno LPN  Subject: RE: medical notes                                Please do so. Thanks     Aliyah   ----- Message -----  From: Sadia Centeno LPN  Sent: 7/1/2020   1:46 PM EDT  To: FABBY Wright  Subject: RE: medical notes                                They cannot send anything since we do not have a release of information form to send them.  I can ask the pt if she would be able to  reports from them.   ----- Message -----  From: Sadia Centeno LPN  Sent: 6/30/2020  11:03 AM EDT  To: Sadia Centeno LPN  Subject: RE: medical notes                                Will call back this afternoon.  They do not open until 1 on Tuesdays.  ----- Message -----  From: Aliyah Castellanos APRN  Sent: 6/29/2020  12:16 PM EDT  To: Sadia Centeno LPN  Subject: medical notes                                    Please obtain imaging reports from Dr Ma at [facility]-[phone]. Thanks

## 2020-10-28 ENCOUNTER — TREATMENT (OUTPATIENT)
Dept: PHYSICAL THERAPY | Facility: CLINIC | Age: 25
End: 2020-10-28

## 2020-10-28 DIAGNOSIS — M54.2 CERVICALGIA: Primary | ICD-10-CM

## 2020-10-28 PROCEDURE — DRYNDL PR CUSTOM DRY NEEDLING SELF PAY: Performed by: PHYSICAL THERAPIST

## 2020-10-28 NOTE — PROGRESS NOTES
Physical Therapy Daily Progress Note Dry Needling Note  Visit: 11    Subjective Tia Schaffer reports: her neck has been tense     Objective   See Exercise, Manual, and Modality Logs for complete treatment. After reviewing all risk of dry needling (including pneumothorax, bruising, infection, nerve injury, and soreness), written informed consent was obtained.  Manual palpation and assessment performed before, during and after dry needling session.  Clean needle technique observed at all times, precautions for lung fields, neurovascular structures observed.       Assessment/Plan:Pt had positive response to treatment. Follow up as needed.     Manual Therapy:          mins  36930;  Therapeutic Exercise:          mins  34128;     Neuromuscular Katja:         mins  54152;    Therapeutic Activity:           mins  91504;     Gait Training:          mins  93679;     Ultrasound:           mins  38335;    Electrical Stimulation:          mins  68653 ( );  Dry Needling   15        mins self-pay  Traction           mins 61725  Canalith Repositioning         mins 86211      Timed Treatment:   15  mins   Total Treatment:      25  mins    JEF Coreas License #: [id number]    Physical Therapist

## 2020-12-28 ENCOUNTER — TELEPHONE (OUTPATIENT)
Dept: INTERNAL MEDICINE | Facility: CLINIC | Age: 25
End: 2020-12-28

## 2020-12-28 ENCOUNTER — TELEMEDICINE (OUTPATIENT)
Dept: INTERNAL MEDICINE | Facility: CLINIC | Age: 25
End: 2020-12-28

## 2020-12-28 DIAGNOSIS — H00.014 HORDEOLUM OF LEFT UPPER EYELID, UNSPECIFIED HORDEOLUM TYPE: Primary | ICD-10-CM

## 2020-12-28 PROCEDURE — 99213 OFFICE O/P EST LOW 20 MIN: CPT | Performed by: INTERNAL MEDICINE

## 2020-12-28 RX ORDER — CIPROFLOXACIN HYDROCHLORIDE 3.5 MG/ML
1 SOLUTION/ DROPS TOPICAL EVERY 4 HOURS
Qty: 1 EACH | Refills: 0 | Status: SHIPPED | OUTPATIENT
Start: 2020-12-28 | End: 2022-03-04

## 2020-12-28 NOTE — PROGRESS NOTES
Subjective   Tia Schaffer is a 25 y.o. female seen today for Eye Problem (left upper eyelid swelling - no itching).     Eye Problem   The left eye is affected. This is a new problem. The current episode started yesterday. The problem occurs constantly. The problem has been unchanged. There was no injury mechanism. The pain is mild. There is no known exposure to pink eye. She does not wear contacts. Pertinent negatives include no blurred vision, eye discharge, double vision, eye redness, fever, itching, nausea, photophobia, recent URI or vomiting.        The following portions of the patient's history were reviewed and updated as appropriate: allergies, current medications, past social history and problem list.    Review of Systems   Constitutional: Negative for chills, fatigue and fever.   Eyes: Positive for pain (discomfort of left upper eyelid with closing eye). Negative for blurred vision, double vision, photophobia, discharge, redness, itching and visual disturbance.   Respiratory: Negative for cough, shortness of breath and wheezing.    Cardiovascular: Negative for chest pain, palpitations and leg swelling.   Gastrointestinal: Negative for nausea and vomiting.       Objective   There were no vitals taken for this visit.  Physical Exam  Constitutional:       Appearance: Normal appearance.   Eyes:      Extraocular Movements: Extraocular movements intact.      Conjunctiva/sclera:      Right eye: Right conjunctiva is not injected. No exudate or hemorrhage.     Left eye: Left conjunctiva is not injected. No exudate or hemorrhage.     Pupils: Pupils are equal, round, and reactive to light.        Comments: Erythema & mild swelling of left upper eyelid margin   Neurological:      Mental Status: She is alert.         Assessment/Plan   Problems Addressed this Visit     None      Visit Diagnoses     Hordeolum of left upper eyelid, unspecified hordeolum type    -  Primary    need warm compresses hourly today - avoid burning  skin - cipro opthal drops sent in -will need to see eye doctor if no better tomorrow or any worse      Diagnoses       Codes Comments    Hordeolum of left upper eyelid, unspecified hordeolum type    -  Primary ICD-10-CM: H00.014  ICD-9-CM: 373.11 need warm compresses hourly today - avoid burning skin - cipro opthal drops sent in -will need to see eye doctor if no better tomorrow or any worse         Video visit 15 minutes today.

## 2020-12-28 NOTE — TELEPHONE ENCOUNTER
PATIENTS MOTHER IS CALLING IN THEY BELIEVE THAT PATIENT HAS A STY IN HER EYE AND THEY ARE WANTING TO KNOW IF DOCTOR CAN SEND SOMETHING IN FOR HER TO PHARMACY.    PLEASE ADVISE    MOM CALLBACK NUMBER IS:  [phone]       PATIENTS NUMBER  [phone]      CONFIRMED PHARMACY  [facility] [address] [place] [address] RD. - [phone]  - [phone] FX

## 2020-12-31 ENCOUNTER — TELEMEDICINE (OUTPATIENT)
Dept: INTERNAL MEDICINE | Facility: CLINIC | Age: 25
End: 2020-12-31

## 2020-12-31 DIAGNOSIS — H00.014 HORDEOLUM OF LEFT UPPER EYELID, UNSPECIFIED HORDEOLUM TYPE: Primary | ICD-10-CM

## 2020-12-31 PROCEDURE — 99213 OFFICE O/P EST LOW 20 MIN: CPT | Performed by: NURSE PRACTITIONER

## 2021-03-18 ENCOUNTER — TREATMENT (OUTPATIENT)
Dept: PHYSICAL THERAPY | Facility: CLINIC | Age: 26
End: 2021-03-18

## 2021-03-18 DIAGNOSIS — M54.2 CERVICALGIA: Primary | ICD-10-CM

## 2021-03-18 PROCEDURE — DRYNDL PR CUSTOM DRY NEEDLING SELF PAY: Performed by: PHYSICAL THERAPIST

## 2021-03-18 NOTE — PROGRESS NOTES
Physical Therapy Daily Progress Note  Visit: 1    Subjective Tia Schaffer reports: B UT tightness     Objective   See Exercise, Manual, and Modality Logs for complete treatment.   After reviewing all risk of dry needling (including pneumothorax, bruising, infection, nerve injury, and soreness), written informed consent was obtained.  Manual palpation and assessment performed before, during and after dry needling session.  Clean needle technique observed at all times, precautions for lung fields, neurovascular structures observed.   MHP applied to B UT x 10 mins post treatment     Assessment/Plan: tolerated dry needling well and no adverse reaction    Manual Therapy:          mins  56326;  Therapeutic Exercise:          mins  51649;     Neuromuscular Katja:         mins  74862;    Therapeutic Activity:           mins  98233;     Gait Training:           mins  10982;     Ultrasound:           mins  41825;    Electrical Stimulation:          mins  31918 ( );  Dry Needling      15     mins self-pay  Traction           mins 18597  Canalith Repositioning         mins 01783      Timed Treatment: 15     mins   Total Treatment:     25   mins    JEF Coreas License #: [id number]    Physical Therapist

## 2021-04-16 ENCOUNTER — TELEPHONE (OUTPATIENT)
Dept: INTERNAL MEDICINE | Facility: CLINIC | Age: 26
End: 2021-04-16

## 2021-04-16 ENCOUNTER — BULK ORDERING (OUTPATIENT)
Dept: CASE MANAGEMENT | Facility: OTHER | Age: 26
End: 2021-04-16

## 2021-04-16 DIAGNOSIS — Z23 IMMUNIZATION DUE: ICD-10-CM

## 2021-04-16 NOTE — TELEPHONE ENCOUNTER
Message left on patients informing her we can't speak to unauthorized parties not on her verbal consent, also we are not able to get her in the office for a physical exam and blood work prior to for a cruise.    I advised her to keep her scheduled appointment she has for her appointment with Dr. Schmidt on 7/6/2021 at 8 AM

## 2021-04-16 NOTE — TELEPHONE ENCOUNTER
Caller: [employer]    Relationship to patient: Other    Best call back number: [phone]    Chief complaint: PATIENT NEEDS TO BE SEEN FOR AN EXAM AND HAVE BLOOD WORK DONE BEFORE SHE CAN HEAD OUT ON A CRUISE. SHE IS SCHEDULED TO LEAVE ON 04/26, SO SHE WILL NEED THIS EXAM ON THE REQUESTED DATES BELOW. PLEASE ADVISE.    Type of visit: OFFICE VISIT FOR EXAM AND LABS    Requested date: Monday 04/19 OR Tuesday 04/20    If rescheduling, when is the original appointment: N/A    Additional notes: THE COMPANY WILL BE FAXING OVER A FORM FOR CREDIT CARD PAYMENT. PLEASE FOLLOW-UP WITH THEM WHEN POSSIBLE.

## 2021-04-20 ENCOUNTER — TREATMENT (OUTPATIENT)
Dept: PHYSICAL THERAPY | Facility: CLINIC | Age: 26
End: 2021-04-20

## 2021-04-20 DIAGNOSIS — M54.2 CERVICALGIA: Primary | ICD-10-CM

## 2021-04-20 PROCEDURE — DRYNDL PR CUSTOM DRY NEEDLING SELF PAY: Performed by: PHYSICAL THERAPIST

## 2021-04-20 NOTE — PROGRESS NOTES
Physical Therapy Daily Progress Note      Patient: Delmy Schaffer   : 1995  Treatment Diagnosis:     ICD-10-CM ICD-9-CM   1. Cervicalgia  M54.2 723.1     Referring practitioner: No Known Provider  Date of Initial Visit: Type: THERAPY  Noted: 3/18/2021  Today's Date: 2021  Patient seen for 2 sessions         Delmy Schaffer reports:     Subjective   Patient reports her shoulders are tight but not as much as they usually are.  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    Objective          Palpation   Left   Hypertonic in the cervical paraspinals, levator scapulae, rhomboids and upper trapezius.   Tenderness of the cervical paraspinals, levator scapulae, rhomboids and upper trapezius.   Trigger point to upper trapezius.     Right   Hypertonic in the cervical paraspinals, levator scapulae, rhomboids and upper trapezius. Tenderness of the cervical paraspinals, levator scapulae, rhomboids and upper trapezius.   Trigger point to upper trapezius.       See Exercise, Manual, and Modality Logs for complete treatment.       Assessment/Plan  Soft tissue was assessed at cervical/UT. PT noted point tenderness as well as palpable trigger points within the muslce tissue. On this date patient stated that they would like to undergo a dry needling procedure for the soft tissue dysfunction. Patient was educated on the procedure for dry needling and consent waver on file. Patient was informed of the risks, possible adverse effects, along with the benefits of DN. Patient responded positively to DN treatment with decreased muscle hypertonicity and TP reactivity.               Timed:  Manual Therapy:         mins  56379;  Therapeutic Exercise:         mins  85144;     Neuromuscular Katja:        mins  81674;    Therapeutic Activity:          mins  69883;     Gait Training:           mins  37893;     Ultrasound:          mins  51878;    Iontophoresis:          mins  69214;  Dry Needling:     15      mins ;    Untimed:  Electrical Stimulation:         mins  35343 ( );  Mechanical Traction:         mins  85585;   Canalith Repositioning:        mins  04655;    Timed Treatment:   15   mins   Total Treatment:     30   mins    Natividad Dickinson, PT  Physical Therapist

## 2021-09-06 DIAGNOSIS — Z30.41 ENCOUNTER FOR SURVEILLANCE OF CONTRACEPTIVE PILLS: ICD-10-CM

## 2021-09-06 RX ORDER — NORETHINDRONE ACETATE AND ETHINYL ESTRADIOL 1; 20 MG/1; UG/1
TABLET ORAL
Qty: 84 TABLET | Refills: 2 | Status: SHIPPED | OUTPATIENT
Start: 2021-09-06 | End: 2022-08-17 | Stop reason: SDUPTHER

## 2021-10-13 ENCOUNTER — OFFICE VISIT (OUTPATIENT)
Dept: OBSTETRICS AND GYNECOLOGY | Age: 26
End: 2021-10-13

## 2021-10-13 VITALS
WEIGHT: 117.6 LBS | BODY MASS INDEX: 20.08 KG/M2 | DIASTOLIC BLOOD PRESSURE: 72 MMHG | SYSTOLIC BLOOD PRESSURE: 116 MMHG | HEIGHT: 64 IN

## 2021-10-13 DIAGNOSIS — N89.8 VAGINAL DISCHARGE: Primary | ICD-10-CM

## 2021-10-13 DIAGNOSIS — Z11.3 SCREEN FOR STD (SEXUALLY TRANSMITTED DISEASE): ICD-10-CM

## 2021-10-13 DIAGNOSIS — R87.612 LOW GRADE SQUAMOUS INTRAEPITHELIAL LESION ON CYTOLOGIC SMEAR OF CERVIX (LGSIL): ICD-10-CM

## 2021-10-13 PROCEDURE — 99213 OFFICE O/P EST LOW 20 MIN: CPT | Performed by: PHYSICIAN ASSISTANT

## 2021-10-13 RX ORDER — CHLORHEXIDINE GLUCONATE 0.12 MG/ML
RINSE ORAL
COMMUNITY
Start: 2021-10-06 | End: 2022-03-04

## 2021-10-13 NOTE — PROGRESS NOTES
"Subjective     Chief Complaint   Patient presents with   • Gynecologic Exam     c/o uti sx, discharge, and irritation on the inside of vagina. pt has been treated for ecurring bv, trich, yeast infection all in the last 6-7 months. pt taking bcp currently, thinking about to switching to something else.        Delmy Schaffer is a 26 y.o.  whose LMP is Patient's last menstrual period was 2021 (approximate). 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 that they gave her that show she was negative for BV      No Additional Complaints Reported    The following portions of the patient's history were reviewed and updated as appropriate:vital signs, allergies, current medications, past family history, past medical history, past social history, past surgical history and problem list      Review of Systems   Genitourinary:positive for vaginal discharge     Objective      /72   Ht 162.6 cm (64\")   Wt 53.3 kg (117 lb 9.6 oz)   LMP 2021 (Approximate)   Breastfeeding No   BMI 20.19 kg/m²     Physical Exam    General:   alert, comfortable and no distress   Heart: Not performed today   Lungs: Not performed today.   Breast: Not performed today   Neck: na   Abdomen: {Not performed today   CVA: Not performed today   Pelvis: External genitalia: normal general appearance  Vaginal: normal mucosa without prolapse or lesions  Cervix: normal appearance, thin prep PAP obtained and GC prep obtained  Adnexa: normal bimanual exam  Uterus: normal single, nontender   Extremities: Not performed today   Neurologic: negative   Psychiatric: Normal affect, judgement, and mood "       Lab Review   Labs: No data reviewed     Imaging   No data reviewed    Assessment/Plan     ASSESSMENT  1. Vaginal discharge    2. Low grade squamous intraepithelial lesion on cytologic smear of cervix (LGSIL)    3. Screen for STD (sexually transmitted disease)        PLAN  1.   Orders Placed This Encounter   Procedures   • NuSwab VG+ - Swab, Vagina       2. No obvious s/s of infection today although the speculum exam was uncomfortable for the pt. Swab and pap was obtained, she has h/o abnormal pap. Will await cultures prior to treatment.  Disc preventative methods to protect/prevent BV. Disc avoidance of shaving, douching. Enc probiotics as well.      Follow up: MARISA Cui  10/13/2021

## 2021-10-15 LAB
CONV .: NORMAL
CYTOLOGIST CVX/VAG CYTO: NORMAL
CYTOLOGY CVX/VAG DOC CYTO: NORMAL
CYTOLOGY CVX/VAG DOC THIN PREP: NORMAL
DX ICD CODE: NORMAL
HIV 1 & 2 AB SER-IMP: NORMAL
OTHER STN SPEC: NORMAL
STAT OF ADQ CVX/VAG CYTO-IMP: NORMAL

## 2021-10-19 ENCOUNTER — TELEPHONE (OUTPATIENT)
Dept: INTERNAL MEDICINE | Facility: CLINIC | Age: 26
End: 2021-10-19

## 2021-10-19 NOTE — TELEPHONE ENCOUNTER
Caller: Cyndi Schaffre    Relationship: Mother    Best call back number: [phone]     What is the best time to reach you: ANY     Who are you requesting to speak with (clinical staff, provider,  specific staff member): N/A     Do you know the name of the person who called: N/A     What was the call regarding: PATIENT STATED SHE HAS AN APPOINTMENT ON 6/7 AND WANTED TO MAKE SURE PATIENT COULD HAVE ENOUGH MEDICATION TO GO TO HER APPOINTMENT . Junel 1/20 1-20 MG-MCG per tablet    Do you require a callback: YES

## 2021-10-20 LAB
A VAGINAE DNA VAG QL NAA+PROBE: NORMAL SCORE
BVAB2 DNA VAG QL NAA+PROBE: NORMAL SCORE
C ALBICANS DNA VAG QL NAA+PROBE: NEGATIVE
C GLABRATA DNA VAG QL NAA+PROBE: NEGATIVE
C TRACH DNA VAG QL NAA+PROBE: NEGATIVE
MEGA1 DNA VAG QL NAA+PROBE: NORMAL SCORE
N GONORRHOEA DNA VAG QL NAA+PROBE: NEGATIVE
T VAGINALIS DNA VAG QL NAA+PROBE: NEGATIVE

## 2022-02-28 ENCOUNTER — OFFICE VISIT (OUTPATIENT)
Dept: OBSTETRICS AND GYNECOLOGY | Age: 27
End: 2022-02-28

## 2022-02-28 VITALS
BODY MASS INDEX: 19.46 KG/M2 | SYSTOLIC BLOOD PRESSURE: 102 MMHG | WEIGHT: 114 LBS | HEIGHT: 64 IN | DIASTOLIC BLOOD PRESSURE: 62 MMHG

## 2022-02-28 DIAGNOSIS — N89.8 VAGINAL DISCHARGE: Primary | ICD-10-CM

## 2022-02-28 PROCEDURE — 99213 OFFICE O/P EST LOW 20 MIN: CPT | Performed by: PHYSICIAN ASSISTANT

## 2022-02-28 NOTE — PROGRESS NOTES
"Subjective     Chief Complaint   Patient presents with   • Gynecologic Exam     c/o possible vaginal infection        Delmy Schaffer is a 26 y.o.  whose LMP is Patient's last menstrual period was 2022 (approximate). 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  Since then has been dealing with recurrent vaginal infections  Now describes a creamy, milky d/c  No itching  Possible odor as well    Declines std testing  Dad's insurance is not the best and has been charged 2-400 dollars for previous testing  Will consider if all testing is negative and sx's persists      No Additional Complaints Reported    The following portions of the patient's history were reviewed and updated as appropriate:vital signs, allergies, current medications, past family history, past medical history, past social history, past surgical history and problem list      Review of Systems   Genitourinary:positive for vaginal discharge     Objective      /62   Ht 162.6 cm (64\")   Wt 51.7 kg (114 lb)   LMP 2022 (Approximate)   Breastfeeding No   BMI 19.57 kg/m²     Physical Exam    General:   alert, comfortable and no distress   Heart: Not performed today   Lungs: Not performed today.   Breast: Not performed today   Neck: na   Abdomen: {Not performed today   CVA: Not performed today   Pelvis: External genitalia: normal general appearance  Vaginal: normal mucosa without prolapse or lesions and discharge, white  Cervix: normal appearance and ph slightly increased, no odor noted   Extremities: Not performed today   Neurologic: negative   Psychiatric: Normal affect, judgement, and mood       Lab Review   Labs: No data reviewed     Imaging   No data reviewed    Assessment/Plan     ASSESSMENT  1. Vaginal discharge        PLAN  1.   Orders Placed This Encounter   Procedures   • NuSwab BV & Candida - , Cervix       2. Await culture prior to treating " infection. If all neg, consider std testing    Pt does ask about coming off bcp since she has been on it so long. Did not encourage this if she is still SA . Risk of pregnancy is too great.     Follow up: 7 month(s) for annual exam    MARISA Howard  2/28/2022

## 2022-03-02 LAB
A VAGINAE DNA VAG QL NAA+PROBE: NORMAL SCORE
BVAB2 DNA VAG QL NAA+PROBE: NORMAL SCORE
C ALBICANS DNA VAG QL NAA+PROBE: NEGATIVE
C GLABRATA DNA VAG QL NAA+PROBE: NEGATIVE
MEGA1 DNA VAG QL NAA+PROBE: NORMAL SCORE

## 2022-03-03 ENCOUNTER — TELEPHONE (OUTPATIENT)
Dept: OBSTETRICS AND GYNECOLOGY | Age: 27
End: 2022-03-03

## 2022-03-03 NOTE — TELEPHONE ENCOUNTER
Pt calling to inquire about if she was tested for GBS. Pt states [place] and told that is what caused her odor and discharge.Pt wanting to know GBS can be run?

## 2022-03-03 NOTE — TELEPHONE ENCOUNTER
I did not. It is not routinely tested as it is not uncommon for people to be carriers of GBS. If she wants to rtc for this testing, it can be done but would need to be done with a different swab

## 2022-03-04 ENCOUNTER — OFFICE VISIT (OUTPATIENT)
Dept: OBSTETRICS AND GYNECOLOGY | Age: 27
End: 2022-03-04

## 2022-03-04 VITALS
DIASTOLIC BLOOD PRESSURE: 64 MMHG | WEIGHT: 116 LBS | BODY MASS INDEX: 19.81 KG/M2 | HEIGHT: 64 IN | SYSTOLIC BLOOD PRESSURE: 108 MMHG

## 2022-03-04 DIAGNOSIS — N89.8 VAGINAL DISCHARGE: Primary | ICD-10-CM

## 2022-03-04 PROCEDURE — 99213 OFFICE O/P EST LOW 20 MIN: CPT | Performed by: NURSE PRACTITIONER

## 2022-03-04 NOTE — PROGRESS NOTES
"Subjective     Chief Complaint   Patient presents with   • Gynecologic Exam     GPS swab       Delmy Schaffer is a 26 y.o.  whose LMP is Patient's last menstrual period was 2022 (approximate).     Pt presents today for GBS testing   She was recently here for BV and yeast testing which was negative  She was treated for GBS previously in [facility] and is requesting to be retested for this  Discussed that this is not routinely tested for   She was treated with multiple rounds of antibiotics due to dental infection etc over the last year  She does have hx of trich infection  States she is SA with her boyfriend who lives in [facility]  Was worried about cost of STD screening due to dads insurance not covering but agreeable today    No Additional Complaints Reported    The following portions of the patient's history were reviewed and updated as appropriate:vital signs, allergies, current medications, past medical history, past social history, past surgical history and problem list      Review of Systems   Pertinent items are noted in HPI.     Objective      /64   Ht 162.6 cm (64\")   Wt 52.6 kg (116 lb)   LMP 2022 (Approximate)   BMI 19.91 kg/m²     Physical Exam    General:   alert and no distress   Heart: Not performed today   Lungs: Not performed today.   Breast: na   Neck: na   Abdomen: {Not performed today   CVA: Not performed today   Pelvis: External genitalia: normal general appearance  Urinary system: urethral meatus normal  Vaginal: normal mucosa without prolapse or lesions, normal without tenderness, induration or masses and normal rugae  Cervix: normal appearance, small amount of white discharge noted   Extremities: Not performed today   Neurologic: negative   Psychiatric: Normal affect, judgement, and mood       Lab Review   Labs: Nuswab     Imaging   No data reviewed    Assessment/Plan     ASSESSMENT  1. Vaginal discharge        PLAN  1.   Orders Placed This Encounter   Procedures   • Strep " B Screen - Swab, Vagina   • Chlamydia trachomatis, Neisseria gonorrhoeae, Trichomonas vaginalis, PCR - Swab, Cervix       2. Medications prescribed this encounter:      No orders of the defined types were placed in this encounter.      3. Vaginal swab for stds and GBS done. Will call with results.     Follow up: STEVEN Valderrama, FABBY  3/4/2022

## 2022-03-06 LAB — GP B STREP DNA SPEC QL NAA+PROBE: POSITIVE

## 2022-03-07 ENCOUNTER — TELEPHONE (OUTPATIENT)
Dept: OBSTETRICS AND GYNECOLOGY | Age: 27
End: 2022-03-07

## 2022-03-07 RX ORDER — FLUCONAZOLE 150 MG/1
150 TABLET ORAL ONCE
Qty: 1 TABLET | Refills: 0 | Status: SHIPPED | OUTPATIENT
Start: 2022-03-07 | End: 2022-03-07

## 2022-03-07 RX ORDER — PENICILLIN V POTASSIUM 250 MG/1
250 TABLET ORAL 4 TIMES DAILY
Qty: 28 TABLET | Refills: 0 | Status: SHIPPED | OUTPATIENT
Start: 2022-03-07 | End: 2022-03-14

## 2022-03-07 RX ORDER — CEPHALEXIN 500 MG/1
500 CAPSULE ORAL 2 TIMES DAILY
Qty: 14 CAPSULE | Refills: 0 | Status: SHIPPED | OUTPATIENT
Start: 2022-03-07 | End: 2022-03-07

## 2022-03-07 NOTE — TELEPHONE ENCOUNTER
Pt calls asking to review her GBS screening, we are still waiting for the STD test to come back. Pt wanting to let you know for the last year and a half she has been told she has BV, and will be prescribed azithromycin with diflucan with no improvement. She was last tested and prescribed these medications while in [facility] [place] this February 2022. PT aware you are seeing patients and may not call with results at any certain time. Pt is currently in [place], [facility] pharmacy has been verified

## 2022-03-07 NOTE — TELEPHONE ENCOUNTER
Spoke w/pt and her mother to notify her of results GBS positive.  Pt's mother asking why we are not prescribing penicillin for her rather than keflex.  She also states that when she is on any antibiotic she gets a yeast infection so she would like to have diflucan prescribed.

## 2022-03-08 LAB
C TRACH RRNA SPEC QL NAA+PROBE: NEGATIVE
N GONORRHOEA RRNA SPEC QL NAA+PROBE: NEGATIVE
T VAGINALIS RRNA SPEC QL NAA+PROBE: NEGATIVE

## 2022-03-28 ENCOUNTER — OFFICE VISIT (OUTPATIENT)
Dept: OBSTETRICS AND GYNECOLOGY | Age: 27
End: 2022-03-28

## 2022-03-28 VITALS
WEIGHT: 115 LBS | BODY MASS INDEX: 19.63 KG/M2 | SYSTOLIC BLOOD PRESSURE: 100 MMHG | DIASTOLIC BLOOD PRESSURE: 60 MMHG | HEIGHT: 64 IN

## 2022-03-28 DIAGNOSIS — N89.8 VAGINAL DISCHARGE: Primary | ICD-10-CM

## 2022-03-28 PROCEDURE — 99213 OFFICE O/P EST LOW 20 MIN: CPT | Performed by: NURSE PRACTITIONER

## 2022-03-28 NOTE — PROGRESS NOTES
"Subjective     Chief Complaint   Patient presents with   • Gynecologic Exam     Vaginal discharge, odor       Delmy Schaffer is a 26 y.o.  whose LMP is Patient's last menstrual period was 2022.     Pt presents today with chief complaint of vaginal discharge   She denies burning, itching or odor  She had recent std screening which returned negative  She was treated for GBS   She is wanting to get retested for this because she is having more discharge      No Additional Complaints Reported    The following portions of the patient's history were reviewed and updated as appropriate:vital signs, allergies, current medications, past medical history, past social history, past surgical history and problem list      Review of Systems   Pertinent items are noted in HPI.     Objective      /60   Ht 162.6 cm (64\")   Wt 52.2 kg (115 lb)   LMP 2022   BMI 19.74 kg/m²     Physical Exam    General:   alert and no distress   Heart: Not performed today   Lungs: Not performed today.   Breast: Not performed today   Neck: na   Abdomen: {Not performed today   CVA: Not performed today   Pelvis: External genitalia: normal general appearance  Urinary system: urethral meatus normal  Vaginal: normal mucosa without prolapse or lesions, normal without tenderness, induration or masses, normal rugae and discharge, white  Cervix: normal appearance   Extremities: Not performed today   Neurologic: negative   Psychiatric: Normal affect, judgement, and mood       Lab Review   Labs: nuswab     Imaging   No data reviewed    Assessment/Plan     ASSESSMENT  1. Vaginal discharge        PLAN  1.   Orders Placed This Encounter   Procedures   • Strep B Screen - Swab, Vagina       2. Medications prescribed this encounter:      No orders of the defined types were placed in this encounter.      3. Declines swab for BV and yeast. I discussed with patient that just because she colonizes GBS does not mean that this is the cause of her " symptoms. Likely just normal vaginal discharge since she has had negative std screening and BV and yeast. I discussed that even if we treat her that she can still re colonize GBS and that this is not normally treated and does not cause symptoms.     Follow up: STEVEN Valderrama, APRN  3/28/2022

## 2022-03-30 LAB — GP B STREP DNA SPEC QL NAA+PROBE: NEGATIVE

## 2022-04-15 ENCOUNTER — OFFICE VISIT (OUTPATIENT)
Dept: FAMILY MEDICINE CLINIC | Facility: CLINIC | Age: 27
End: 2022-04-15

## 2022-04-15 VITALS
TEMPERATURE: 97.5 F | BODY MASS INDEX: 19.81 KG/M2 | DIASTOLIC BLOOD PRESSURE: 62 MMHG | WEIGHT: 116 LBS | SYSTOLIC BLOOD PRESSURE: 96 MMHG | OXYGEN SATURATION: 98 % | HEIGHT: 64 IN | HEART RATE: 68 BPM | RESPIRATION RATE: 16 BRPM

## 2022-04-15 DIAGNOSIS — G89.29 CHRONIC NECK PAIN: ICD-10-CM

## 2022-04-15 DIAGNOSIS — G89.29 CHRONIC BILATERAL THORACIC BACK PAIN: ICD-10-CM

## 2022-04-15 DIAGNOSIS — M54.2 CHRONIC NECK PAIN: ICD-10-CM

## 2022-04-15 DIAGNOSIS — M54.6 CHRONIC BILATERAL THORACIC BACK PAIN: ICD-10-CM

## 2022-04-15 DIAGNOSIS — G44.89 OTHER HEADACHE SYNDROME: ICD-10-CM

## 2022-04-15 DIAGNOSIS — M54.2 CERVICALGIA: Primary | ICD-10-CM

## 2022-04-15 DIAGNOSIS — R20.2 PARESTHESIA OF UPPER EXTREMITY: ICD-10-CM

## 2022-04-15 PROCEDURE — 99203 OFFICE O/P NEW LOW 30 MIN: CPT | Performed by: NURSE PRACTITIONER

## 2022-04-15 RX ORDER — MELOXICAM 7.5 MG/1
7.5 TABLET ORAL DAILY
Qty: 14 TABLET | Refills: 0 | Status: SHIPPED | OUTPATIENT
Start: 2022-04-15 | End: 2022-09-14

## 2022-04-15 NOTE — PATIENT INSTRUCTIONS
May use cold compress/ice pack 10-15 minutes at a time several times a day   May use warm compress/heating pad 10-15 minutes at at time several times a day  May use over the counter biofreeze as needed (wash off from skin before using heating pad  May use meloxicam script to help pain

## 2022-04-15 NOTE — PROGRESS NOTES
Anh Schaffer is a 26 y.o.. female.     Pt here today to become established.    Pt here today with c/o chronic neck and upper back pain. Pt stating it orginally started with a work related injury that she was seen for, had imaging and physical therapy and was eventually signed off for recovery. Pt stating she then had re-occurrence of her pain. Pt stating she has gone through physical therapy, dry needling, accu-pressure and chiropractor visits bu pain re-occurs. Pt stating she works out. Pt stating she has nipple piercings that she would have to have removed if having imaging.     Last pap 3/2022, lmp: 4/14/22    Neck Pain   This is a chronic problem. Episode onset: 3 yrs. The problem has been waxing and waning. Associated symptoms include headaches and tingling (in wrist/forearms at times). Pertinent negatives include no chest pain, fever or numbness.   Back Pain  This is a chronic problem. Episode onset: 3 yrs. The problem has been waxing and waning since onset. The pain is present in the thoracic spine. Associated symptoms include headaches and tingling (in wrist/forearms at times). Pertinent negatives include no chest pain, fever or numbness.       The following portions of the patient's history were reviewed and updated as appropriate: allergies, current medications, past family history, past medical history, past social history, past surgical history and problem list.    Past Medical History:   Diagnosis Date   • Abnormal Pap smear of cervix 01/2018    LGSIL   • Allergic    • Hip pain    • Irregular menses        History reviewed. No pertinent surgical history.    Review of Systems   Constitutional: Negative.  Negative for fever.   Eyes: Negative.  Negative for visual disturbance.   Respiratory: Negative.  Negative for shortness of breath.    Cardiovascular: Negative.  Negative for chest pain.   Musculoskeletal: Positive for back pain and neck pain.   Neurological: Positive for tingling (in  "wrist/forearms at times) and headaches. Negative for dizziness, light-headedness and numbness.       No Known Allergies    Objective     Vitals:    04/15/22 1443   BP: 96/62   Pulse: 68   Resp: 16   Temp: 97.5 °F (36.4 °C)   TempSrc: Oral   SpO2: 98%   Weight: 52.6 kg (116 lb)   Height: 162.6 cm (64\")     Body mass index is 19.91 kg/m².    Physical Exam  Vitals reviewed.   HENT:      Head: Normocephalic.   Eyes:      Pupils: Pupils are equal, round, and reactive to light.   Cardiovascular:      Rate and Rhythm: Normal rate and regular rhythm.   Pulmonary:      Effort: Pulmonary effort is normal.      Breath sounds: Normal breath sounds.   Musculoskeletal:      Cervical back: Tenderness present. No edema, erythema, rigidity or bony tenderness. Normal range of motion.      Thoracic back: Tenderness present. No edema or bony tenderness. Normal range of motion.   Neurological:      Mental Status: She is alert and oriented to person, place, and time.   Psychiatric:         Behavior: Behavior normal.           Current Outpatient Medications:   •  Fexofenadine HCl (ALLERGY 24-HR PO), Take  by mouth., Disp: , Rfl:   •  Junel 1/20 1-20 MG-MCG per tablet, TAKE 1 TABLET BY MOUTH EVERY DAY, Disp: 84 tablet, Rfl: 2  •  meloxicam (Mobic) 7.5 MG tablet, Take 1 tablet by mouth Daily., Disp: 14 tablet, Rfl: 0      Assessment/Plan   Diagnoses and all orders for this visit:    1. Cervicalgia (Primary)  -     Cancel: MRI Cervical Spine Without Contrast; Future  -     meloxicam (Mobic) 7.5 MG tablet; Take 1 tablet by mouth Daily.  Dispense: 14 tablet; Refill: 0  -     MRI Cervical Spine Without Contrast; Future    2. Chronic neck pain  -     Cancel: MRI Cervical Spine Without Contrast; Future  -     MRI Cervical Spine Without Contrast; Future    3. Chronic bilateral thoracic back pain  -     Cancel: MRI thoracic spine wo contrast; Future  -     MRI thoracic spine wo contrast; Future    4. Paresthesia of upper extremity  Comments:  on " occassion   Orders:  -     MRI Cervical Spine Without Contrast; Future  -     MRI thoracic spine wo contrast; Future    5. Other headache syndrome  -     MRI Cervical Spine Without Contrast; Future  -     MRI thoracic spine wo contrast; Future        Patient Instructions   May use cold compress/ice pack 10-15 minutes at a time several times a day   May use warm compress/heating pad 10-15 minutes at at time several times a day  May use over the counter biofreeze as needed (wash off from skin before using heating pad  May use meloxicam script to help pain       Return for fasting labs and then physical.

## 2022-04-25 ENCOUNTER — OFFICE VISIT (OUTPATIENT)
Dept: OBSTETRICS AND GYNECOLOGY | Age: 27
End: 2022-04-25

## 2022-04-25 VITALS
HEIGHT: 64 IN | DIASTOLIC BLOOD PRESSURE: 62 MMHG | SYSTOLIC BLOOD PRESSURE: 100 MMHG | BODY MASS INDEX: 19.63 KG/M2 | WEIGHT: 115 LBS

## 2022-04-25 DIAGNOSIS — N89.8 VAGINAL DISCHARGE: ICD-10-CM

## 2022-04-25 DIAGNOSIS — R30.0 DYSURIA: Primary | ICD-10-CM

## 2022-04-25 LAB
BILIRUB BLD-MCNC: NEGATIVE MG/DL
CLARITY, POC: CLEAR
COLOR UR: YELLOW
GLUCOSE UR STRIP-MCNC: NEGATIVE MG/DL
KETONES UR QL: ABNORMAL
LEUKOCYTE EST, POC: ABNORMAL
NITRITE UR-MCNC: NEGATIVE MG/ML
PH UR: 7 [PH] (ref 5–8)
PROT UR STRIP-MCNC: NEGATIVE MG/DL
RBC # UR STRIP: ABNORMAL /UL
SP GR UR: 1.02 (ref 1–1.03)
UROBILINOGEN UR QL: NORMAL

## 2022-04-25 PROCEDURE — 99213 OFFICE O/P EST LOW 20 MIN: CPT | Performed by: NURSE PRACTITIONER

## 2022-04-25 PROCEDURE — 81002 URINALYSIS NONAUTO W/O SCOPE: CPT | Performed by: NURSE PRACTITIONER

## 2022-04-25 NOTE — PROGRESS NOTES
"Subjective     Chief Complaint   Patient presents with   • Gynecologic Exam     Vaginal discharge, painful intercourse, burning after intercourse       Delmy Schaffer is a 26 y.o.  whose LMP is Patient's last menstrual period was 2022.     Pt presents today with chief complaint of vaginal discharge, painful IC with burning afterwards  She is SA with one partner and had recent std screening that returned negative  She is concerned with GBS bacteria although it has been discussed several times that this is not something that is routinely tested or a cause for symptoms  She was treated prior and then retested and it was negative  She has noted some white discharge, no odor  She has noticed some vague urinary symptoms      No Additional Complaints Reported    The following portions of the patient's history were reviewed and updated as appropriate:vital signs, allergies, current medications, past medical history, past social history, past surgical history and problem list      Review of Systems   Pertinent items are noted in HPI.     Objective      /62   Ht 162.6 cm (64\")   Wt 52.2 kg (115 lb)   LMP 2022   BMI 19.74 kg/m²     Physical Exam    General:   alert and no distress   Heart: Not performed today   Lungs: Not performed today.   Breast: Not performed today   Neck: na   Abdomen: {Not performed today   CVA: Not performed today   Pelvis: External genitalia: normal general appearance  Urinary system: urethral meatus normal  Vaginal: normal mucosa without prolapse or lesions, normal without tenderness, induration or masses, normal rugae and presence of blood  Cervix: normal appearance   Extremities: Not performed today   Neurologic: negative   Psychiatric: Normal affect, judgement, and mood       Lab Review   Labs: U/A     Imaging   No data reviewed    Assessment/Plan     ASSESSMENT  1. Dysuria    2. Vaginal discharge        PLAN  1.   Orders Placed This Encounter   Procedures   • Urine Culture " - Urine, Urine, Clean Catch   • Strep B Screen - Swab, Vagina   • NuSwab BV & Candida - Swab, Vagina   • POC Urinalysis Dipstick       2. Medications prescribed this encounter:      No orders of the defined types were placed in this encounter.      3. Pt prefers to wait for urine culture to return. Will treat if indicated. Vaginal swab for BV and yeast. GBS swab sent per patient request.   Follow up: FABBY Gonzalez  4/25/2022

## 2022-04-27 ENCOUNTER — TELEPHONE (OUTPATIENT)
Dept: OBSTETRICS AND GYNECOLOGY | Age: 27
End: 2022-04-27

## 2022-04-27 LAB
BACTERIA UR CULT: NORMAL
BACTERIA UR CULT: NORMAL
GP B STREP DNA SPEC QL NAA+PROBE: NEGATIVE

## 2022-05-05 DIAGNOSIS — M54.6 CHRONIC BILATERAL THORACIC BACK PAIN: ICD-10-CM

## 2022-05-05 DIAGNOSIS — R20.2 PARESTHESIA OF UPPER EXTREMITY: ICD-10-CM

## 2022-05-05 DIAGNOSIS — M54.2 CHRONIC NECK PAIN: ICD-10-CM

## 2022-05-05 DIAGNOSIS — G89.29 CHRONIC BILATERAL THORACIC BACK PAIN: ICD-10-CM

## 2022-05-05 DIAGNOSIS — G44.89 OTHER HEADACHE SYNDROME: ICD-10-CM

## 2022-05-05 DIAGNOSIS — M54.2 CERVICALGIA: ICD-10-CM

## 2022-05-05 DIAGNOSIS — G89.29 CHRONIC NECK PAIN: ICD-10-CM

## 2022-05-12 ENCOUNTER — TELEPHONE (OUTPATIENT)
Dept: FAMILY MEDICINE CLINIC | Facility: CLINIC | Age: 27
End: 2022-05-12

## 2022-05-12 NOTE — TELEPHONE ENCOUNTER
Maryam,      Please see patient message below. Looks like her imaging was scanned in. Let me know if anything additional is needed.      Thanks,  Ayden

## 2022-05-12 NOTE — TELEPHONE ENCOUNTER
Caller: Delmy Schaffer    Relationship: Self    Best call back number: [phone]    Caller requesting test results: PATIENT    What test was performed: MRI    When was the test performed: 5/3/22    Where was the test performed: [facility]    Additional notes: PATIENT RECEIVED RESULTS VIA [employer], AND IS REQUESTING A CALL BACK TO GO OVER THE MRI RESULTS.

## 2022-05-13 ENCOUNTER — OFFICE VISIT (OUTPATIENT)
Dept: FAMILY MEDICINE CLINIC | Facility: CLINIC | Age: 27
End: 2022-05-13

## 2022-05-13 VITALS
DIASTOLIC BLOOD PRESSURE: 64 MMHG | OXYGEN SATURATION: 99 % | RESPIRATION RATE: 18 BRPM | HEART RATE: 57 BPM | HEIGHT: 64 IN | SYSTOLIC BLOOD PRESSURE: 118 MMHG | WEIGHT: 115 LBS | TEMPERATURE: 98 F | BODY MASS INDEX: 19.63 KG/M2

## 2022-05-13 DIAGNOSIS — M54.2 CERVICALGIA: Primary | ICD-10-CM

## 2022-05-13 DIAGNOSIS — M50.30 DDD (DEGENERATIVE DISC DISEASE), CERVICAL: ICD-10-CM

## 2022-05-13 DIAGNOSIS — M48.02 NEURAL FORAMINAL STENOSIS OF CERVICAL SPINE: ICD-10-CM

## 2022-05-13 PROCEDURE — 99213 OFFICE O/P EST LOW 20 MIN: CPT | Performed by: NURSE PRACTITIONER

## 2022-05-13 RX ORDER — CYCLOBENZAPRINE HCL 5 MG
5 TABLET ORAL 3 TIMES DAILY PRN
Qty: 30 TABLET | Refills: 0 | Status: SHIPPED | OUTPATIENT
Start: 2022-05-13 | End: 2022-09-14

## 2022-05-13 RX ORDER — METHYLPREDNISOLONE 4 MG/1
TABLET ORAL
Qty: 1 EACH | Refills: 0 | Status: SHIPPED | OUTPATIENT
Start: 2022-05-13 | End: 2022-06-03

## 2022-05-13 NOTE — PATIENT INSTRUCTIONS
Discussed MRI results in depth, pt given hard copy to take with her.  Discussed importance of body mechanics to help with neck, discussed need for proper exercising and stretching to help with prevent of flair ups and to treat flair ups when happening; discussed importance of good pillows and good sleep habits to help with neck. Discussed treatment options when having flair ups.   Script for Physical therapy given so pt can price check for self pay prices.  May use cold compress/ice pack 10-15 minutes at a time several times a day   May use warm compress/heating pad 10-15 minutes at at time several times a day  May use over the counter biofreeze as needed (wash off from skin before using heating pad)

## 2022-05-13 NOTE — PROGRESS NOTES
"Subjective     Tia Schaffer is a 26 y.o.. female.     Pt here today to go over MRI of cervical and thoracic spine. Pt stating she has been having muscle spasms in her neck more recently. Pt stating she just came from a cupping therapy.      The following portions of the patient's history were reviewed and updated as appropriate: allergies, current medications, past family history, past medical history, past social history, past surgical history and problem list.    Past Medical History:   Diagnosis Date   • Abnormal Pap smear of cervix 01/2018    LGSIL   • Allergic    • Hip pain    • Irregular menses        History reviewed. No pertinent surgical history.    Review of Systems   Constitutional: Negative.    Respiratory: Negative.    Cardiovascular: Negative.    Musculoskeletal: Positive for neck pain.       No Known Allergies    Objective     Vitals:    05/13/22 1320   BP: 118/64   Pulse: 57   Resp: 18   Temp: 98 °F (36.7 °C)   TempSrc: Temporal   SpO2: 99%   Weight: 52.2 kg (115 lb)   Height: 162.6 cm (64.02\")     Body mass index is 19.73 kg/m².    Physical Exam  Vitals reviewed.   HENT:      Head: Normocephalic.   Eyes:      Pupils: Pupils are equal, round, and reactive to light.   Cardiovascular:      Rate and Rhythm: Normal rate and regular rhythm.   Pulmonary:      Effort: Pulmonary effort is normal.      Breath sounds: Normal breath sounds.   Musculoskeletal:      Cervical back: Spasms and tenderness present. No edema, rigidity, bony tenderness or crepitus. Pain with movement present.      Comments: Bruising from cupping noted   Neurological:      Mental Status: She is alert and oriented to person, place, and time.   Psychiatric:         Behavior: Behavior normal.           Current Outpatient Medications:   •  Fexofenadine HCl (ALLERGY 24-HR PO), Take  by mouth., Disp: , Rfl:   •  Junel 1/20 1-20 MG-MCG per tablet, TAKE 1 TABLET BY MOUTH EVERY DAY, Disp: 84 tablet, Rfl: 2  •  meloxicam (Mobic) 7.5 MG tablet, Take " 1 tablet by mouth Daily., Disp: 14 tablet, Rfl: 0  •  cyclobenzaprine (FLEXERIL) 5 MG tablet, Take 1 tablet by mouth 3 (Three) Times a Day As Needed for Muscle Spasms., Disp: 30 tablet, Rfl: 0  •  Diclofenac Sodium (VOLTAREN) 1 % gel gel, Apply 4 g topically to the appropriate area as directed 4 (Four) Times a Day As Needed (cervical)., Disp: 50 g, Rfl: 1  •  methylPREDNISolone (MEDROL) 4 MG dose pack, Take as directed on package instructions., Disp: 1 each, Rfl: 0      Diagnoses and all orders for this visit:    1. Cervicalgia (Primary)  -     cyclobenzaprine (FLEXERIL) 5 MG tablet; Take 1 tablet by mouth 3 (Three) Times a Day As Needed for Muscle Spasms.  Dispense: 30 tablet; Refill: 0  -     methylPREDNISolone (MEDROL) 4 MG dose pack; Take as directed on package instructions.  Dispense: 1 each; Refill: 0  -     Diclofenac Sodium (VOLTAREN) 1 % gel gel; Apply 4 g topically to the appropriate area as directed 4 (Four) Times a Day As Needed (cervical).  Dispense: 50 g; Refill: 1    2. DDD (degenerative disc disease), cervical  Comments:  mild straightening of cervical spine    3. Neural foraminal stenosis of cervical spine  Comments:  C4-C5        Patient Instructions   Discussed MRI results in depth, pt given hard copy to take with her.  Discussed importance of body mechanics to help with neck, discussed need for proper exercising and stretching to help with prevent of flair ups and to treat flair ups when happening; discussed importance of good pillows and good sleep habits to help with neck. Discussed treatment options when having flair ups.   Script for Physical therapy given so pt can price check for self pay prices.  May use cold compress/ice pack 10-15 minutes at a time several times a day   May use warm compress/heating pad 10-15 minutes at at time several times a day  May use over the counter biofreeze as needed (wash off from skin before using heating pad)        Return if symptoms worsen or fail to  improve.

## 2022-05-13 NOTE — TELEPHONE ENCOUNTER
Patient scheduled for a f/u visit today per her request @ 1pm. Nothing further needed at this time.      Ayden

## 2022-05-19 DIAGNOSIS — Z30.41 ENCOUNTER FOR SURVEILLANCE OF CONTRACEPTIVE PILLS: ICD-10-CM

## 2022-05-19 RX ORDER — NORETHINDRONE ACETATE AND ETHINYL ESTRADIOL 1; 20 MG/1; UG/1
TABLET ORAL
Qty: 84 TABLET | Refills: 2 | OUTPATIENT
Start: 2022-05-19

## 2022-06-01 DIAGNOSIS — Z00.00 ANNUAL PHYSICAL EXAM: Primary | ICD-10-CM

## 2022-06-02 LAB
ALBUMIN SERPL-MCNC: 4.6 G/DL (ref 3.9–5)
ALBUMIN/GLOB SERPL: 2 {RATIO} (ref 1.2–2.2)
ALP SERPL-CCNC: 57 IU/L (ref 44–121)
ALT SERPL-CCNC: 8 IU/L (ref 0–32)
APPEARANCE UR: CLEAR
AST SERPL-CCNC: 12 IU/L (ref 0–40)
BACTERIA #/AREA URNS HPF: NORMAL /[HPF]
BASOPHILS # BLD AUTO: 0.1 X10E3/UL (ref 0–0.2)
BASOPHILS NFR BLD AUTO: 1 %
BILIRUB SERPL-MCNC: 0.9 MG/DL (ref 0–1.2)
BILIRUB UR QL STRIP: NEGATIVE
BUN SERPL-MCNC: 7 MG/DL (ref 6–20)
BUN/CREAT SERPL: 9 (ref 9–23)
CALCIUM SERPL-MCNC: 9.9 MG/DL (ref 8.7–10.2)
CASTS URNS QL MICRO: NORMAL /LPF
CHLORIDE SERPL-SCNC: 103 MMOL/L (ref 96–106)
CHOLEST SERPL-MCNC: 158 MG/DL (ref 100–199)
CO2 SERPL-SCNC: 22 MMOL/L (ref 20–29)
COLOR UR: YELLOW
CREAT SERPL-MCNC: 0.8 MG/DL (ref 0.57–1)
EGFRCR SERPLBLD CKD-EPI 2021: 105 ML/MIN/1.73
EOSINOPHIL # BLD AUTO: 0.2 X10E3/UL (ref 0–0.4)
EOSINOPHIL NFR BLD AUTO: 4 %
EPI CELLS #/AREA URNS HPF: NORMAL /HPF (ref 0–10)
ERYTHROCYTE [DISTWIDTH] IN BLOOD BY AUTOMATED COUNT: 11.6 % (ref 11.7–15.4)
GLOBULIN SER CALC-MCNC: 2.3 G/DL (ref 1.5–4.5)
GLUCOSE SERPL-MCNC: 81 MG/DL (ref 65–99)
GLUCOSE UR QL STRIP: NEGATIVE
HBA1C MFR BLD: 5.1 % (ref 4.8–5.6)
HCT VFR BLD AUTO: 44.4 % (ref 34–46.6)
HDLC SERPL-MCNC: 71 MG/DL
HGB BLD-MCNC: 14 G/DL (ref 11.1–15.9)
HGB UR QL STRIP: NEGATIVE
IMM GRANULOCYTES # BLD AUTO: 0 X10E3/UL (ref 0–0.1)
IMM GRANULOCYTES NFR BLD AUTO: 0 %
KETONES UR QL STRIP: NEGATIVE
LDLC SERPL CALC-MCNC: 73 MG/DL (ref 0–99)
LDLC/HDLC SERPL: 1 RATIO (ref 0–3.2)
LEUKOCYTE ESTERASE UR QL STRIP: ABNORMAL
LYMPHOCYTES # BLD AUTO: 1.8 X10E3/UL (ref 0.7–3.1)
LYMPHOCYTES NFR BLD AUTO: 31 %
MCH RBC QN AUTO: 29 PG (ref 26.6–33)
MCHC RBC AUTO-ENTMCNC: 31.5 G/DL (ref 31.5–35.7)
MCV RBC AUTO: 92 FL (ref 79–97)
MICRO URNS: ABNORMAL
MONOCYTES # BLD AUTO: 0.3 X10E3/UL (ref 0.1–0.9)
MONOCYTES NFR BLD AUTO: 5 %
NEUTROPHILS # BLD AUTO: 3.4 X10E3/UL (ref 1.4–7)
NEUTROPHILS NFR BLD AUTO: 59 %
NITRITE UR QL STRIP: NEGATIVE
PH UR STRIP: 7 [PH] (ref 5–7.5)
PLATELET # BLD AUTO: 354 X10E3/UL (ref 150–450)
POTASSIUM SERPL-SCNC: 4.2 MMOL/L (ref 3.5–5.2)
PROT SERPL-MCNC: 6.9 G/DL (ref 6–8.5)
PROT UR QL STRIP: NEGATIVE
RBC # BLD AUTO: 4.83 X10E6/UL (ref 3.77–5.28)
RBC #/AREA URNS HPF: NORMAL /HPF (ref 0–2)
SODIUM SERPL-SCNC: 139 MMOL/L (ref 134–144)
SP GR UR STRIP: 1 (ref 1–1.03)
T4 FREE SERPL-MCNC: 1.4 NG/DL (ref 0.82–1.77)
TRIGL SERPL-MCNC: 73 MG/DL (ref 0–149)
TSH SERPL DL<=0.005 MIU/L-ACNC: 1.24 UIU/ML (ref 0.45–4.5)
UROBILINOGEN UR STRIP-MCNC: 0.2 MG/DL (ref 0.2–1)
VLDLC SERPL CALC-MCNC: 14 MG/DL (ref 5–40)
WBC # BLD AUTO: 5.8 X10E3/UL (ref 3.4–10.8)
WBC #/AREA URNS HPF: NORMAL /HPF (ref 0–5)

## 2022-06-03 ENCOUNTER — OFFICE VISIT (OUTPATIENT)
Dept: FAMILY MEDICINE CLINIC | Facility: CLINIC | Age: 27
End: 2022-06-03

## 2022-06-03 VITALS
WEIGHT: 117 LBS | DIASTOLIC BLOOD PRESSURE: 56 MMHG | RESPIRATION RATE: 18 BRPM | HEART RATE: 61 BPM | BODY MASS INDEX: 19.97 KG/M2 | TEMPERATURE: 97.7 F | HEIGHT: 64 IN | OXYGEN SATURATION: 98 % | SYSTOLIC BLOOD PRESSURE: 98 MMHG

## 2022-06-03 DIAGNOSIS — Z23 NEED FOR TDAP VACCINATION: ICD-10-CM

## 2022-06-03 DIAGNOSIS — G47.09 OTHER INSOMNIA: ICD-10-CM

## 2022-06-03 DIAGNOSIS — Z00.00 ANNUAL PHYSICAL EXAM: Primary | ICD-10-CM

## 2022-06-03 PROCEDURE — 90471 IMMUNIZATION ADMIN: CPT | Performed by: NURSE PRACTITIONER

## 2022-06-03 PROCEDURE — 90715 TDAP VACCINE 7 YRS/> IM: CPT | Performed by: NURSE PRACTITIONER

## 2022-06-03 PROCEDURE — 99395 PREV VISIT EST AGE 18-39: CPT | Performed by: NURSE PRACTITIONER

## 2022-06-03 NOTE — PROGRESS NOTES
Subjective   Delmy Schaffer is a 26 y.o. female. Patient is here today for   Chief Complaint   Patient presents with   • Annual Exam     Pt here for yearly exam.           Vitals:    06/03/22 1308   BP: 98/56   Pulse: 61   Resp: 18   Temp: 97.7 °F (36.5 °C)   SpO2: 98%     Body mass index is 20.07 kg/m².    The following portions of the patient's history were reviewed and updated as appropriate: allergies, current medications, past family history, past medical history, past social history, past surgical history and problem list.    Past Medical History:   Diagnosis Date   • Abnormal Pap smear of cervix 01/2018    LGSIL   • Allergic    • Hip pain    • Irregular menses       No Known Allergies   Social History     Socioeconomic History   • Marital status: Single   Tobacco Use   • Smoking status: Never Smoker   • Smokeless tobacco: Never Used   Vaping Use   • Vaping Use: Never used   Substance and Sexual Activity   • Alcohol use: Yes     Comment: occasional   • Drug use: No   • Sexual activity: Yes     Partners: Male     Birth control/protection: OCP        Current Outpatient Medications:   •  cyclobenzaprine (FLEXERIL) 5 MG tablet, Take 1 tablet by mouth 3 (Three) Times a Day As Needed for Muscle Spasms., Disp: 30 tablet, Rfl: 0  •  Diclofenac Sodium (VOLTAREN) 1 % gel gel, Apply 4 g topically to the appropriate area as directed 4 (Four) Times a Day As Needed (cervical)., Disp: 50 g, Rfl: 1  •  Fexofenadine HCl (ALLERGY 24-HR PO), Take  by mouth., Disp: , Rfl:   •  Junel 1/20 1-20 MG-MCG per tablet, TAKE 1 TABLET BY MOUTH EVERY DAY, Disp: 84 tablet, Rfl: 2  •  meloxicam (Mobic) 7.5 MG tablet, Take 1 tablet by mouth Daily., Disp: 14 tablet, Rfl: 0         Objective   Pt here today for annual physical.      Delmy Schaffer 26 y.o. female who presents for an Annual Wellness Visit.  she has a history of   Patient Active Problem List   Diagnosis   • Cervicalgia   • Chronic neck pain   • Chronic bilateral thoracic back pain   •  Paresthesia of upper extremity   • Other headache syndrome   • DDD (degenerative disc disease), cervical   • Neural foraminal stenosis of cervical spine   • Other insomnia   .  she has been doing well with new interval problems.  Labs results discussed in detail with the patient.  Plan to update vaccines if needed today.    Health Habits:  Dental Exam. up to date;6/2/22  Eye Exam. up to date; 2 weeks ago  Exercise: 7 times/week.  Current exercise activities include: walking   Eats mostly healthy, drinks mostly water, occasionally drinks caffeine      Recent Results (from the past 336 hour(s))   CBC & Differential    Collection Time: 06/01/22 10:08 AM    Specimen: Blood   Result Value Ref Range    WBC 5.8 3.4 - 10.8 x10E3/uL    RBC 4.83 3.77 - 5.28 x10E6/uL    Hemoglobin 14.0 11.1 - 15.9 g/dL    Hematocrit 44.4 34.0 - 46.6 %    MCV 92 79 - 97 fL    MCH 29.0 26.6 - 33.0 pg    MCHC 31.5 31.5 - 35.7 g/dL    RDW 11.6 (L) 11.7 - 15.4 %    Platelets 354 150 - 450 x10E3/uL    Neutrophil Rel % 59 Not Estab. %    Lymphocyte Rel % 31 Not Estab. %    Monocyte Rel % 5 Not Estab. %    Eosinophil Rel % 4 Not Estab. %    Basophil Rel % 1 Not Estab. %    Neutrophils Absolute 3.4 1.4 - 7.0 x10E3/uL    Lymphocytes Absolute 1.8 0.7 - 3.1 x10E3/uL    Monocytes Absolute 0.3 0.1 - 0.9 x10E3/uL    Eosinophils Absolute 0.2 0.0 - 0.4 x10E3/uL    Basophils Absolute 0.1 0.0 - 0.2 x10E3/uL    Immature Granulocyte Rel % 0 Not Estab. %    Immature Grans Absolute 0.0 0.0 - 0.1 x10E3/uL   Comprehensive Metabolic Panel    Collection Time: 06/01/22 10:08 AM    Specimen: Blood   Result Value Ref Range    Glucose 81 65 - 99 mg/dL    BUN 7 6 - 20 mg/dL    Creatinine 0.80 0.57 - 1.00 mg/dL    EGFR Result 105 >59 mL/min/1.73    BUN/Creatinine Ratio 9 9 - 23    Sodium 139 134 - 144 mmol/L    Potassium 4.2 3.5 - 5.2 mmol/L    Chloride 103 96 - 106 mmol/L    Total CO2 22 20 - 29 mmol/L    Calcium 9.9 8.7 - 10.2 mg/dL    Total Protein 6.9 6.0 - 8.5 g/dL     Albumin 4.6 3.9 - 5.0 g/dL    Globulin 2.3 1.5 - 4.5 g/dL    A/G Ratio 2.0 1.2 - 2.2    Total Bilirubin 0.9 0.0 - 1.2 mg/dL    Alkaline Phosphatase 57 44 - 121 IU/L    AST (SGOT) 12 0 - 40 IU/L    ALT (SGPT) 8 0 - 32 IU/L   Lipid Panel With LDL / HDL Ratio    Collection Time: 06/01/22 10:08 AM    Specimen: Blood   Result Value Ref Range    Total Cholesterol 158 100 - 199 mg/dL    Triglycerides 73 0 - 149 mg/dL    HDL Cholesterol 71 >39 mg/dL    VLDL Cholesterol Freddy 14 5 - 40 mg/dL    LDL Chol Calc (NIH) 73 0 - 99 mg/dL    LDL/HDL RATIO 1.0 0.0 - 3.2 ratio   TSH    Collection Time: 06/01/22 10:08 AM    Specimen: Blood   Result Value Ref Range    TSH 1.240 0.450 - 4.500 uIU/mL   T4, free    Collection Time: 06/01/22 10:08 AM    Specimen: Blood   Result Value Ref Range    Free T4 1.40 0.82 - 1.77 ng/dL   Urinalysis With Microscopic - Urine, Clean Catch    Collection Time: 06/01/22 10:08 AM    Specimen: Urine, Clean Catch   Result Value Ref Range    Specific Gravity, UA 1.005 1.005 - 1.030    pH, UA 7.0 5.0 - 7.5    Color, UA Yellow Yellow    Appearance, UA Clear Clear    Leukocytes, UA 1+ (A) Negative    Protein Negative Negative/Trace    Glucose, UA Negative Negative    Ketones Negative Negative    Blood, UA Negative Negative    Bilirubin, UA Negative Negative    Urobilinogen, UA 0.2 0.2 - 1.0 mg/dL    Nitrite, UA Negative Negative    Microscopic Examination See below:    Hemoglobin A1c    Collection Time: 06/01/22 10:08 AM    Specimen: Blood   Result Value Ref Range    Hemoglobin A1C 5.1 4.8 - 5.6 %   Microscopic Examination -    Collection Time: 06/01/22 10:08 AM   Result Value Ref Range    WBC, UA 0-5 0 - 5 /hpf    RBC, UA None seen 0 - 2 /hpf    Epithelial Cells (non renal) 0-10 0 - 10 /hpf    Casts None seen None seen /lpf    Bacteria, UA None seen None seen/Few         Review of Systems   Constitutional: Negative.    Respiratory: Negative.    Cardiovascular: Negative.    Gastrointestinal: Negative.     Genitourinary: Negative.    Musculoskeletal: Positive for neck pain (improving, in physical therapy).   Psychiatric/Behavioral: Positive for sleep disturbance (waking up through out night; melatonin does not work). The patient is nervous/anxious.        Physical Exam  Constitutional:       Appearance: Normal appearance. She is well-developed.   HENT:      Head: Normocephalic and atraumatic.      Right Ear: Tympanic membrane normal. Tympanic membrane is not erythematous.      Left Ear: Tympanic membrane normal. Tympanic membrane is not erythematous.      Nose: Nose normal.   Eyes:      Conjunctiva/sclera: Conjunctivae normal.      Pupils: Pupils are equal, round, and reactive to light.   Neck:      Thyroid: No thyromegaly.      Vascular: No carotid bruit.      Trachea: No tracheal deviation.   Cardiovascular:      Rate and Rhythm: Normal rate and regular rhythm.      Pulses: Normal pulses.      Heart sounds: Normal heart sounds. No murmur heard.  Pulmonary:      Effort: No accessory muscle usage or respiratory distress.      Breath sounds: Normal breath sounds. No stridor. No decreased breath sounds, wheezing, rhonchi or rales.   Abdominal:      General: Bowel sounds are normal. There is no distension.      Palpations: Abdomen is soft. Abdomen is not rigid. There is no mass.      Tenderness: There is no abdominal tenderness. There is no guarding or rebound.      Hernia: No hernia is present.   Musculoskeletal:         General: Normal range of motion.      Cervical back: Normal range of motion and neck supple.   Lymphadenopathy:      Cervical: No cervical adenopathy.   Skin:     General: Skin is warm and dry.      Capillary Refill: Capillary refill takes less than 2 seconds.   Neurological:      Mental Status: She is alert and oriented to person, place, and time.      Cranial Nerves: No cranial nerve deficit.      Sensory: No sensory deficit.      Motor: No abnormal muscle tone.      Coordination: Coordination  normal.   Psychiatric:         Speech: Speech normal.         Behavior: Behavior normal.         ASSESSMENT       Problems Addressed this Visit        Sleep    Other insomnia      Other Visit Diagnoses     Annual physical exam    -  Primary    Need for Tdap vaccination        Relevant Orders    Tdap Vaccine Greater Than or Equal To 8yo IM (Completed)      Diagnoses       Codes Comments    Annual physical exam    -  Primary ICD-10-CM: Z00.00  ICD-9-CM: V70.0     Other insomnia     ICD-10-CM: G47.09  ICD-9-CM: 780.52     Need for Tdap vaccination     ICD-10-CM: Z23  ICD-9-CM: V06.1           PLAN    Patient Instructions   Continue to eat a heart healthy diet  Continue to drink plenty of water, goal 64 oz a day  Work on increasing length of time of exercise   Stop all electronics 1-2 hours before sleep  Start a bedtime routine that allows for relaxation before bedtime.  Recommend relaxation to be shower/bath, reading a book, doing crossword puzzle, puzzles, drawing, ect.    If above fails rto for medication for insomnia.          Return for Annual physical.

## 2022-06-03 NOTE — PATIENT INSTRUCTIONS
Continue to eat a heart healthy diet  Continue to drink plenty of water, goal 64 oz a day  Work on increasing length of time of exercise   Stop all electronics 1-2 hours before sleep  Start a bedtime routine that allows for relaxation before bedtime.  Recommend relaxation to be shower/bath, reading a book, doing crossword puzzle, puzzles, drawing, ect.    If above fails rto for medication for insomnia.

## 2022-06-29 ENCOUNTER — TELEPHONE (OUTPATIENT)
Dept: FAMILY MEDICINE CLINIC | Facility: CLINIC | Age: 27
End: 2022-06-29

## 2022-06-29 NOTE — TELEPHONE ENCOUNTER
Caller: SchafferDelmy    Relationship: Self    Best call back number: [phone]    Who are you requesting to speak with (clinical staff, provider,  specific staff member): FABBY LEDESMA OR MA    What was the call regarding: PATIENT STATES SHE HAD WHAT SEEMED TO BE A PIMPLE IN HER PRIVATE REGION. IT WENT AWAY, BUT SEEMS TO HAVE LEFT A SCAR, AND SOME SORT OF KNOT IN THE AREA. REQUESTS A CALL BACK TO DISCUSS IF SHE SHOULD MAKE AN APPOINTMENT WITH HER GYNECOLOGIST OR IF SHE WOULD BE ABLE TO BE SEEN BY FABBY LEDESMA FOR THE ISSUE.    Do you require a callback: YES

## 2022-07-01 ENCOUNTER — TELEPHONE (OUTPATIENT)
Dept: FAMILY MEDICINE CLINIC | Facility: CLINIC | Age: 27
End: 2022-07-01

## 2022-07-01 NOTE — TELEPHONE ENCOUNTER
Caller: Delmy Schaffer    Relationship to patient: Self    Best call back number: [phone]     Date of positive COVID19 test: 6/30/22 AT HOME TEST, WAITING ON RESULTS FOR PCR TEST DONE AT FACILITY    COVID19 symptoms: BODY ACHES, CONGESTION, COUGH, FATIGUE    Additional information or concerns: PATIENT COULD NOT COMPLETE HER MIDTERM EXAM YESTERDAY DUE TO BEING SICK, SHE DID TEST POSITIVE ON AN AT HOME TEST AND WANTS TO KNOW IF [facility] CAN WRITE HER A DOCTOR'S NOTE STATING SHE DID TEST POSITIVE SO THAT SHE IS ABLE TO MAKE UP HER MIDTERM EXAM. SHE WOULD LIKE IT UPLOADED TO [facility].    PLEASE ADVISE    PATIENT STATES IT IS OKAY TO LEAVE DETAILED VOICEMAIL IF SHE DOESN'T ANSWER    What is the patients preferred pharmacy: [facility] [address] [address] [place] [phone] [facility] [phone]   [phone]

## 2022-07-01 NOTE — TELEPHONE ENCOUNTER
PT CALLED BACK AND DOES NOT WANT TO MAKE AN APPOINTMENT RIGHT NOW. I EXPLAINED TO PATIENT SHE HAS TO HAVE A REAL TEST RESULT AND SEE INGE BEFORE DOCTOR NOTE WILL BE WRITTEN .

## 2022-07-05 ENCOUNTER — TELEPHONE (OUTPATIENT)
Dept: FAMILY MEDICINE CLINIC | Facility: CLINIC | Age: 27
End: 2022-07-05

## 2022-07-05 NOTE — TELEPHONE ENCOUNTER
Please call pt and inform her that the letter is now in my chart and she can print letter out. thanks

## 2022-08-17 ENCOUNTER — TELEPHONE (OUTPATIENT)
Dept: OBSTETRICS AND GYNECOLOGY | Age: 27
End: 2022-08-17

## 2022-08-17 DIAGNOSIS — Z30.41 ENCOUNTER FOR SURVEILLANCE OF CONTRACEPTIVE PILLS: ICD-10-CM

## 2022-08-17 RX ORDER — NORETHINDRONE ACETATE AND ETHINYL ESTRADIOL 1; .02 MG/1; MG/1
1 TABLET ORAL DAILY
Qty: 84 TABLET | Refills: 2 | Status: SHIPPED | OUTPATIENT
Start: 2022-08-17

## 2022-08-17 NOTE — TELEPHONE ENCOUNTER
Caller: SarbjitDelmy    Relationship: Self    Best call back number:[phone]  Requested Prescriptions:   Requested Prescriptions      No prescriptions requested or ordered in this encounter      Junel 1/20 1-20 MG-MCG per tablet       Pharmacy where request should be sent: [facility] [id number] [facility] [employer]     Additional details provided by patient: PT IS CURRENTLY TRAVELING AND SHE HAS LOST HER CURRENT PACK OF THIS BC AND HAS MISSED A DAY NOW, SHE IS NEEDING A REFILL, SINCE SHE'S MISSED ONE DAY, WHEN WILL BE THE APPROPRIATE TIME TO START TAKING THIS BC   WOULD LIKE A CALL BACK FOR GENERAL INFORMATION ABOUT THIS, ANYTIME, OK TO LEAVE A VM.     Does the patient have less than a 3 day supply:  [x] Yes  [] No    Rashmi OVIEDO Rep   08/17/22 10:24 EDT

## 2022-08-18 RX ORDER — NORETHINDRONE ACETATE AND ETHINYL ESTRADIOL 1; 20 MG/1; UG/1
TABLET ORAL
Qty: 84 TABLET | Refills: 2 | OUTPATIENT
Start: 2022-08-18

## 2022-09-14 ENCOUNTER — OFFICE VISIT (OUTPATIENT)
Dept: OBSTETRICS AND GYNECOLOGY | Age: 27
End: 2022-09-14

## 2022-09-14 VITALS
BODY MASS INDEX: 20.32 KG/M2 | HEIGHT: 64 IN | SYSTOLIC BLOOD PRESSURE: 98 MMHG | DIASTOLIC BLOOD PRESSURE: 58 MMHG | WEIGHT: 119 LBS

## 2022-09-14 DIAGNOSIS — R35.0 URINARY FREQUENCY: ICD-10-CM

## 2022-09-14 DIAGNOSIS — N89.8 VAGINAL DISCHARGE: ICD-10-CM

## 2022-09-14 DIAGNOSIS — Z30.41 ORAL CONTRACEPTIVE PILL SURVEILLANCE: ICD-10-CM

## 2022-09-14 DIAGNOSIS — Z12.4 SCREENING FOR CERVICAL CANCER: ICD-10-CM

## 2022-09-14 DIAGNOSIS — Z01.419 WELL WOMAN EXAM WITH ROUTINE GYNECOLOGICAL EXAM: Primary | ICD-10-CM

## 2022-09-14 LAB
BILIRUB BLD-MCNC: NEGATIVE MG/DL
CLARITY, POC: CLEAR
COLOR UR: YELLOW
GLUCOSE UR STRIP-MCNC: NEGATIVE MG/DL
KETONES UR QL: NEGATIVE
LEUKOCYTE EST, POC: ABNORMAL
NITRITE UR-MCNC: NEGATIVE MG/ML
PH UR: 7 [PH] (ref 5–8)
PROT UR STRIP-MCNC: NEGATIVE MG/DL
RBC # UR STRIP: NEGATIVE /UL
SP GR UR: 1.01 (ref 1–1.03)
UROBILINOGEN UR QL: ABNORMAL

## 2022-09-14 PROCEDURE — 81002 URINALYSIS NONAUTO W/O SCOPE: CPT | Performed by: NURSE PRACTITIONER

## 2022-09-14 PROCEDURE — 99395 PREV VISIT EST AGE 18-39: CPT | Performed by: NURSE PRACTITIONER

## 2022-09-14 NOTE — PROGRESS NOTES
Subjective     Chief Complaint   Patient presents with   • Gynecologic Exam     Ae, LAST PAP 10/13/2021 NEG  Cc; vaginal discharge, frequent urination       History of Present Illness    Delmy Schaffer is a 27 y.o.  who presents for annual exam.    Doing well   Here for annual but also has chief complaint of vaginal discharge and urinary frequency  She had two new sexual partners, used condoms  Discharge started a couple of weeks ago   Denies itching or odor but noted dryness  Used an OTC vaginal suppository from [place]   Her menses are regular every 28-30 days, lasting 4-7 days, dysmenorrhea none   Obstetric History:  OB History        0    Para   0    Term   0       0    AB   0    Living   0       SAB   0    IAB   0    Ectopic   0    Molar   0    Multiple   0    Live Births   0               Menstrual History:     Patient's last menstrual period was 2022.         Current contraception: OCP (estrogen/progesterone)  History of abnormal Pap smear: no  Received Gardasil immunization: yes  Perform regular self breast exam: no  Family history of uterine or ovarian cancer: no  Family History of colon cancer: no  Family history of breast cancer: no    Mammogram: not indicated.  Colonoscopy: not indicated.  DEXA: not indicated.    Exercise: moderately active  Calcium/Vitamin D: adequate intake    The following portions of the patient's history were reviewed and updated as appropriate: allergies, current medications, past family history, past medical history, past social history, past surgical history and problem list.    Review of Systems   Constitutional: Negative.    Respiratory: Negative.    Cardiovascular: Negative.    Gastrointestinal: Negative.    Genitourinary: Positive for frequency and vaginal discharge.   Skin: Negative.    Psychiatric/Behavioral: Negative.            Objective   Physical Exam  Constitutional:       General: She is awake.      Appearance: Normal appearance. She is  well-developed.   HENT:      Head: Normocephalic and atraumatic.      Nose: Nose normal.   Neck:      Thyroid: No thyroid mass, thyromegaly or thyroid tenderness.   Cardiovascular:      Rate and Rhythm: Normal rate and regular rhythm.      Pulses: Normal pulses.      Heart sounds: Normal heart sounds.   Pulmonary:      Effort: Pulmonary effort is normal.      Breath sounds: Normal breath sounds.   Chest:   Breasts: Breasts are symmetrical.      Right: Normal. No swelling, bleeding, inverted nipple, mass, nipple discharge, skin change, tenderness or supraclavicular adenopathy.      Left: Normal. No swelling, bleeding, inverted nipple, mass, nipple discharge, skin change, tenderness or supraclavicular adenopathy.       Abdominal:      General: Abdomen is flat. Bowel sounds are normal.      Palpations: Abdomen is soft.      Tenderness: There is no abdominal tenderness.   Genitourinary:     General: Normal vulva.      Labia:         Right: No rash, tenderness, lesion or injury.         Left: No rash, tenderness, lesion or injury.       Urethra: No prolapse, urethral pain, urethral swelling or urethral lesion.      Vagina: Normal. No signs of injury. No vaginal discharge, erythema, tenderness, bleeding, lesions or prolapsed vaginal walls.      Cervix: No discharge, friability, lesion, erythema or cervical bleeding.      Uterus: Normal. Not enlarged, not tender and no uterine prolapse.       Adnexa: Right adnexa normal and left adnexa normal.        Right: No mass, tenderness or fullness.          Left: No mass, tenderness or fullness.        Rectum: Normal. No mass.      Comments: Yellow discharge noted on exam   Musculoskeletal:      Cervical back: Normal range of motion and neck supple.   Lymphadenopathy:      Upper Body:      Right upper body: No supraclavicular adenopathy.      Left upper body: No supraclavicular adenopathy.   Skin:     General: Skin is warm and dry.   Neurological:      General: No focal deficit  "present.      Mental Status: She is alert and oriented to person, place, and time.   Psychiatric:         Mood and Affect: Mood normal.         Behavior: Behavior normal. Behavior is cooperative.         Thought Content: Thought content normal.         Judgment: Judgment normal.         BP 98/58   Ht 162.6 cm (64\")   Wt 54 kg (119 lb)   LMP 08/20/2022   BMI 20.43 kg/m²     Assessment & Plan   Diagnoses and all orders for this visit:    1. Well woman exam with routine gynecological exam (Primary)    2. Screening for cervical cancer  -     IGP, Rfx Aptima HPV ASCU    3. Vaginal discharge  -     NuSwab VG+ - Swab, Vagina    4. Urinary frequency  -     POC Urinalysis Dipstick    5. Oral contraceptive pill surveillance        All questions answered.  Breast self exam technique reviewed and patient encouraged to perform self-exam monthly.  Discussed healthy lifestyle modifications.  Recommended 30 minutes of aerobic exercise five times per week.  Discussed calcium needs to prevent osteoporosis.    -Pap today  -Vaginal swab for std's, BV and yeast  -Urine culture sent               "

## 2022-09-19 LAB
BACTERIA UR CULT: NORMAL
BACTERIA UR CULT: NORMAL

## 2022-11-08 ENCOUNTER — OFFICE VISIT (OUTPATIENT)
Dept: OBSTETRICS AND GYNECOLOGY | Age: 27
End: 2022-11-08

## 2022-11-08 VITALS
HEIGHT: 64 IN | WEIGHT: 120 LBS | SYSTOLIC BLOOD PRESSURE: 106 MMHG | DIASTOLIC BLOOD PRESSURE: 58 MMHG | BODY MASS INDEX: 20.49 KG/M2

## 2022-11-08 DIAGNOSIS — K13.70 ORAL LESION: Primary | ICD-10-CM

## 2022-11-08 PROCEDURE — 99213 OFFICE O/P EST LOW 20 MIN: CPT | Performed by: NURSE PRACTITIONER

## 2022-11-08 NOTE — PROGRESS NOTES
"Subjective     Chief Complaint   Patient presents with   • Follow-up     Gyn discuss HSV contact, no outbreak today.        Delmy Schaffer is a 27 y.o.  whose LMP is Patient's last menstrual period was 2022.     Pt presents today to discuss herpes   She states she had IC with partner who states he has herpes  Pt has known cold sores prior to this exposure  She has never had a genital lesion  Would like blood work today  No other concerns or complaints    No Additional Complaints Reported    The following portions of the patient's history were reviewed and updated as appropriate:vital signs, allergies, current medications, past medical history, past social history, past surgical history and problem list      Review of Systems   Pertinent items are noted in HPI.     Objective      /58   Ht 162.6 cm (64\")   Wt 54.4 kg (120 lb)   LMP 2022   BMI 20.60 kg/m²     Physical Exam    General:   alert and no distress   Heart: Not performed today   Lungs: Not performed today.   Breast: Not performed today   Neck: na   Abdomen: {Not performed today   CVA: Not performed today   Pelvis: External genitalia: normal general appearance   Extremities: Not performed today   Neurologic: negative   Psychiatric: Normal affect, judgement, and mood       Lab Review   Labs: No data reviewed     Imaging   No data reviewed    Assessment & Plan     ASSESSMENT  1. Oral lesion        PLAN  1.   Orders Placed This Encounter   Procedures   • HSV 1 & 2 - Specific Antibody, IgG       2. Medications prescribed this encounter:      No orders of the defined types were placed in this encounter.      3. Will check blood work today and call with results.    Follow up: STEVEN Valderrama, FABBY  2022           "

## 2022-11-09 LAB
HSV1 IGG SER IA-ACNC: 36.5 INDEX (ref 0–0.9)
HSV2 IGG SER IA-ACNC: <0.91 INDEX (ref 0–0.9)

## 2022-11-28 ENCOUNTER — OFFICE VISIT (OUTPATIENT)
Dept: FAMILY MEDICINE CLINIC | Facility: CLINIC | Age: 27
End: 2022-11-28

## 2022-11-28 VITALS
HEIGHT: 64 IN | RESPIRATION RATE: 18 BRPM | DIASTOLIC BLOOD PRESSURE: 60 MMHG | BODY MASS INDEX: 20.59 KG/M2 | SYSTOLIC BLOOD PRESSURE: 102 MMHG | TEMPERATURE: 98.8 F | OXYGEN SATURATION: 98 % | HEART RATE: 90 BPM

## 2022-11-28 DIAGNOSIS — R05.1 ACUTE COUGH: ICD-10-CM

## 2022-11-28 DIAGNOSIS — J02.9 PHARYNGITIS, UNSPECIFIED ETIOLOGY: ICD-10-CM

## 2022-11-28 DIAGNOSIS — H65.111 ACUTE MUCOID OTITIS MEDIA OF RIGHT EAR: Primary | ICD-10-CM

## 2022-11-28 LAB
EXPIRATION DATE: NORMAL
EXPIRATION DATE: NORMAL
FLUAV AG NPH QL: NEGATIVE
FLUBV AG NPH QL: NEGATIVE
INTERNAL CONTROL: NORMAL
INTERNAL CONTROL: NORMAL
Lab: NORMAL
Lab: NORMAL
S PYO AG THROAT QL: NEGATIVE

## 2022-11-28 PROCEDURE — 99213 OFFICE O/P EST LOW 20 MIN: CPT | Performed by: NURSE PRACTITIONER

## 2022-11-28 PROCEDURE — 87804 INFLUENZA ASSAY W/OPTIC: CPT | Performed by: NURSE PRACTITIONER

## 2022-11-28 PROCEDURE — 87880 STREP A ASSAY W/OPTIC: CPT | Performed by: NURSE PRACTITIONER

## 2022-11-28 RX ORDER — BENZONATATE 100 MG/1
100 CAPSULE ORAL 3 TIMES DAILY PRN
Qty: 30 CAPSULE | Refills: 0 | Status: SHIPPED | OUTPATIENT
Start: 2022-11-28

## 2022-11-28 RX ORDER — AZITHROMYCIN 250 MG/1
TABLET, FILM COATED ORAL
Qty: 6 TABLET | Refills: 0 | Status: SHIPPED | OUTPATIENT
Start: 2022-11-28

## 2022-11-28 NOTE — PATIENT INSTRUCTIONS
Drink plenty of fluids-water preferably, eat a heart healthy diet, get plenty of sleep and do warm salt water gargles twice a day until feeling better; Pt verb. Understanding.

## 2022-11-28 NOTE — PROGRESS NOTES
"Anh Schaffer is a 27 y.o.. female.     Pt stating she took covid test on Saturday and Sunday and both were negative.     Cough  Associated symptoms include postnasal drip and a sore throat. Pertinent negatives include no ear pain, fever, headaches, rhinorrhea or shortness of breath.   Sore Throat   This is a new problem. Episode onset: 2 days. Associated symptoms include congestion and coughing (productive). Pertinent negatives include no diarrhea, ear pain, headaches, shortness of breath or vomiting.   URI   Associated symptoms include congestion, coughing (productive) and a sore throat. Pertinent negatives include no diarrhea, ear pain, headaches, nausea, rhinorrhea or vomiting.       The following portions of the patient's history were reviewed and updated as appropriate: allergies, current medications, past family history, past medical history, past social history, past surgical history and problem list.    Past Medical History:   Diagnosis Date   • Abnormal Pap smear of cervix 01/2018    LGSIL   • Allergic    • Hip pain    • Irregular menses        No past surgical history on file.    Review of Systems   Constitutional: Negative for fever.   HENT: Positive for congestion, postnasal drip and sore throat. Negative for ear pain and rhinorrhea.    Respiratory: Positive for cough (productive). Negative for shortness of breath.    Gastrointestinal: Negative for diarrhea, nausea and vomiting.   Neurological: Negative for headaches.       No Known Allergies    Objective     Vitals:    11/28/22 0922   BP: 102/60   Pulse: 90   Resp: 18   Temp: 98.8 °F (37.1 °C)   TempSrc: Oral   SpO2: 98%   Height: 162.6 cm (64.02\")     Body mass index is 20.59 kg/m².    Physical Exam  Vitals reviewed.   Constitutional:       Appearance: She is well-developed.   HENT:      Head: Normocephalic and atraumatic.      Right Ear: A middle ear effusion is present. Tympanic membrane is erythematous.      Left Ear: Tympanic " membrane normal. Tympanic membrane is not erythematous.      Nose: Congestion and rhinorrhea present.      Mouth/Throat:      Mouth: Mucous membranes are moist.      Pharynx: Oropharyngeal exudate (thick pnd) and posterior oropharyngeal erythema present. No pharyngeal swelling.   Eyes:      Conjunctiva/sclera: Conjunctivae normal.      Pupils: Pupils are equal, round, and reactive to light.   Cardiovascular:      Rate and Rhythm: Normal rate and regular rhythm.      Heart sounds: No murmur heard.  Pulmonary:      Effort: Pulmonary effort is normal.      Breath sounds: No wheezing, rhonchi or rales.   Musculoskeletal:         General: Normal range of motion.   Lymphadenopathy:      Head:      Right side of head: Tonsillar (shotty) adenopathy present.      Left side of head: Tonsillar (shotty) adenopathy present.      Cervical: No cervical adenopathy.   Skin:     General: Skin is warm and dry.   Neurological:      Mental Status: She is alert and oriented to person, place, and time.           Current Outpatient Medications:   •  CRANBERRY PO, Take  by mouth., Disp: , Rfl:   •  Fexofenadine HCl (ALLERGY 24-HR PO), Take  by mouth., Disp: , Rfl:   •  norethindrone-ethinyl estradiol (Junel 1/20) 1-20 MG-MCG per tablet, Take 1 tablet by mouth Daily., Disp: 84 tablet, Rfl: 2  •  Omega-3 Fatty Acids (FISH OIL PO), Take  by mouth., Disp: , Rfl:   •  Probiotic Product (PROBIOTIC PO), Take  by mouth., Disp: , Rfl:   •  azithromycin (Zithromax) 250 MG tablet, Take 2 tablets the first day, then 1 tablet daily for 4 days., Disp: 6 tablet, Rfl: 0  •  benzonatate (Tessalon Perles) 100 MG capsule, Take 1 capsule by mouth 3 (Three) Times a Day As Needed for Cough., Disp: 30 capsule, Rfl: 0    Recent Results (from the past 168 hour(s))   POCT Influenza A/B    Collection Time: 11/28/22  9:45 AM    Specimen: Swab   Result Value Ref Range    Rapid Influenza A Ag Negative Negative    Rapid Influenza B Ag Negative Negative    Internal  Control Passed Passed    Lot Number [id number]     Expiration Date 10/21/2023    POCT rapid strep A    Collection Time: 11/28/22  9:47 AM    Specimen: Swab   Result Value Ref Range    Rapid Strep A Screen Negative Negative, VALID, INVALID, Not Performed    Internal Control Passed Passed    Lot Number [id number]     Expiration Date 12/31/2023          Diagnoses and all orders for this visit:    1. Acute mucoid otitis media of right ear (Primary)  -     azithromycin (Zithromax) 250 MG tablet; Take 2 tablets the first day, then 1 tablet daily for 4 days.  Dispense: 6 tablet; Refill: 0    2. Pharyngitis, unspecified etiology  -     POCT rapid strep A    3. Acute cough  -     POCT Influenza A/B  -     benzonatate (Tessalon Perles) 100 MG capsule; Take 1 capsule by mouth 3 (Three) Times a Day As Needed for Cough.  Dispense: 30 capsule; Refill: 0        Patient Instructions   Drink plenty of fluids-water preferably, eat a heart healthy diet, get plenty of sleep and do warm salt water gargles twice a day until feeling better; Pt verb. Understanding.       Return if symptoms worsen or fail to improve.

## 2023-04-28 DIAGNOSIS — Z30.41 ENCOUNTER FOR SURVEILLANCE OF CONTRACEPTIVE PILLS: ICD-10-CM

## 2023-04-28 RX ORDER — NORETHINDRONE ACETATE AND ETHINYL ESTRADIOL 1; 20 MG/1; UG/1
TABLET ORAL
Qty: 84 TABLET | Refills: 2 | Status: SHIPPED | OUTPATIENT
Start: 2023-04-28

## 2023-06-06 ENCOUNTER — TELEPHONE (OUTPATIENT)
Dept: FAMILY MEDICINE CLINIC | Facility: CLINIC | Age: 28
End: 2023-06-06
Payer: MEDICAID

## 2023-06-06 NOTE — TELEPHONE ENCOUNTER
Caller: Delmy Schaffer    Relationship to patient: Self    Best call back number: [phone]    Type of visit: LABS    Requested date: TUESDAY OR THURSDAYS     If rescheduling, when is the original appointment: 6/7/23

## 2023-06-09 ENCOUNTER — OFFICE VISIT (OUTPATIENT)
Dept: FAMILY MEDICINE CLINIC | Facility: CLINIC | Age: 28
End: 2023-06-09
Payer: MEDICAID

## 2023-06-09 VITALS
OXYGEN SATURATION: 98 % | DIASTOLIC BLOOD PRESSURE: 68 MMHG | BODY MASS INDEX: 20.93 KG/M2 | TEMPERATURE: 96.9 F | WEIGHT: 122 LBS | SYSTOLIC BLOOD PRESSURE: 90 MMHG | HEART RATE: 58 BPM

## 2023-06-09 DIAGNOSIS — K02.9 CARIES: ICD-10-CM

## 2023-06-09 DIAGNOSIS — J01.01 ACUTE RECURRENT MAXILLARY SINUSITIS: Primary | ICD-10-CM

## 2023-06-09 PROBLEM — M48.02 NEURAL FORAMINAL STENOSIS OF CERVICAL SPINE: Status: RESOLVED | Noted: 2022-05-13 | Resolved: 2023-06-09

## 2023-06-09 PROBLEM — M54.2 CERVICALGIA: Status: RESOLVED | Noted: 2022-04-15 | Resolved: 2023-06-09

## 2023-06-09 PROCEDURE — 1160F RVW MEDS BY RX/DR IN RCRD: CPT | Performed by: INTERNAL MEDICINE

## 2023-06-09 PROCEDURE — 1159F MED LIST DOCD IN RCRD: CPT | Performed by: INTERNAL MEDICINE

## 2023-06-09 PROCEDURE — 99213 OFFICE O/P EST LOW 20 MIN: CPT | Performed by: INTERNAL MEDICINE

## 2023-06-09 RX ORDER — AZITHROMYCIN 250 MG/1
TABLET, FILM COATED ORAL
Qty: 6 TABLET | Refills: 0 | Status: SHIPPED | OUTPATIENT
Start: 2023-06-09

## 2023-06-09 RX ORDER — FLUCONAZOLE 150 MG/1
150 TABLET ORAL ONCE
Qty: 1 TABLET | Refills: 0 | Status: SHIPPED | OUTPATIENT
Start: 2023-06-09 | End: 2023-06-09

## 2023-06-09 NOTE — PROGRESS NOTES
Jeff Vila MD  Internal Medicine  [phone] (office)             06/09/2023    Patient Information  Delmy Schaffer                                                                                          [id number] [facility] [address]  1995        Chief Complaint   Patient presents with    Sinus Problem          History of Present Illness:    Delmy Schaffer is a 27 y.o. female who presents to the office with chief complaint of worsening sinus congestion and cough. She reports allergies have been difficult to control despite various combinations of oral antihistamines and decongestants. She has only used Flonase intermittently as she didn't think it was helpful and doesn't like to use nasal sprays. Symptoms have become worse over the last week and she believes she has a sinus infection. Reports home COVID19 test was negative.           No Known Allergies        Current Outpatient Medications:     azithromycin (Zithromax Z-Tuan) 250 MG tablet, Take 2 tablets by mouth on day 1, then 1 tablet daily on days 2-5, Disp: 6 tablet, Rfl: 0    benzonatate (Tessalon Perles) 100 MG capsule, Take 1 capsule by mouth 3 (Three) Times a Day As Needed for Cough., Disp: 30 capsule, Rfl: 0    CRANBERRY PO, Take  by mouth., Disp: , Rfl:     Fexofenadine HCl (ALLERGY 24-HR PO), Take  by mouth., Disp: , Rfl:     fluconazole (Diflucan) 150 MG tablet, Take 1 tablet by mouth 1 (One) Time for 1 dose., Disp: 1 tablet, Rfl: 0    Junel 1/20 1-20 MG-MCG per tablet, TAKE 1 TABLET BY MOUTH EVERY DAY, Disp: 84 tablet, Rfl: 2    Omega-3 Fatty Acids (FISH OIL PO), Take  by mouth., Disp: , Rfl:     Probiotic Product (PROBIOTIC PO), Take  by mouth., Disp: , Rfl:     Sod Fluoride-Potassium Nitrate 1.1-5 % gel, Apply 100 mL to teeth 2 (Two) Times a Day., Disp: 100 mL, Rfl: 11      Social History     Socioeconomic History    Marital status: Single   Tobacco Use    Smoking status: Never    Smokeless tobacco: Never   Vaping Use     Vaping Use: Never used   Substance and Sexual Activity    Alcohol use: Yes     Comment: occasional    Drug use: No    Sexual activity: Yes     Partners: Male     Birth control/protection: OCP         Vitals:    06/09/23 1124   BP: 90/68   Pulse: 58   Temp: 96.9 °F (36.1 °C)   SpO2: 98%   Weight: 55.3 kg (122 lb)      Wt Readings from Last 3 Encounters:   06/09/23 55.3 kg (122 lb)   11/08/22 54.4 kg (120 lb)   09/14/22 54 kg (119 lb)     Body mass index is 20.93 kg/m².      Physical Exam  Vitals reviewed.   Constitutional:       Appearance: Normal appearance. She is not ill-appearing or diaphoretic.   Neurological:      Mental Status: She is alert and oriented to person, place, and time.   Psychiatric:         Mood and Affect: Mood normal.         Behavior: Behavior normal.          Assessment/Plan:    Diagnoses and all orders for this visit:    1. Acute recurrent maxillary sinusitis (Primary)  Overview:  Encouraged adherence to Flonase and patient can continue oral antihistamine and decongestant. Allergy Consult placed and azithromycin ordered.     Orders:  -     azithromycin (Zithromax Z-Tuan) 250 MG tablet; Take 2 tablets by mouth on day 1, then 1 tablet daily on days 2-5  Dispense: 6 tablet; Refill: 0  -     fluconazole (Diflucan) 150 MG tablet; Take 1 tablet by mouth 1 (One) Time for 1 dose.  Dispense: 1 tablet; Refill: 0  -     Ambulatory Referral to Allergy    2. Caries  -     Sod Fluoride-Potassium Nitrate 1.1-5 % gel; Apply 100 mL to teeth 2 (Two) Times a Day.  Dispense: 100 mL; Refill: 11

## 2023-06-14 ENCOUNTER — OFFICE VISIT (OUTPATIENT)
Dept: FAMILY MEDICINE CLINIC | Facility: CLINIC | Age: 28
End: 2023-06-14
Payer: MEDICAID

## 2023-06-14 VITALS
SYSTOLIC BLOOD PRESSURE: 132 MMHG | TEMPERATURE: 96.6 F | HEART RATE: 61 BPM | DIASTOLIC BLOOD PRESSURE: 62 MMHG | WEIGHT: 125.9 LBS | BODY MASS INDEX: 21.49 KG/M2 | OXYGEN SATURATION: 98 % | HEIGHT: 64 IN

## 2023-06-14 DIAGNOSIS — J01.01 ACUTE RECURRENT MAXILLARY SINUSITIS: Primary | ICD-10-CM

## 2023-06-14 PROCEDURE — 99213 OFFICE O/P EST LOW 20 MIN: CPT | Performed by: INTERNAL MEDICINE

## 2023-06-14 RX ORDER — FLUCONAZOLE 150 MG/1
TABLET ORAL
COMMUNITY
Start: 2023-06-09 | End: 2023-06-14

## 2023-06-14 NOTE — PROGRESS NOTES
"        Jeff Vila MD  Internal Medicine  [phone] (office)             06/14/2023    Patient Information  Delmy Schaffer                                                                                          [address] [facility] [address]  1995        Chief Complaint   Patient presents with    Cough    Nasal Congestion    Sore Throat          History of Present Illness:    Delmy Schaffer is a 27 y.o. female who presents to the office for follow-up of sinus congestion and cough. She completed azithromycin course without much relief. Patient states she became concerned that allergy medications were causing her lack of smell so she self discontinued all of them. She denies fever, chills, dyspnea, poor PO intake.         No Known Allergies        Current Outpatient Medications:     Junel 1/20 1-20 MG-MCG per tablet, TAKE 1 TABLET BY MOUTH EVERY DAY, Disp: 84 tablet, Rfl: 2    Sod Fluoride-Potassium Nitrate 1.1-5 % gel, Apply 100 mL to teeth 2 (Two) Times a Day., Disp: 100 mL, Rfl: 11      Social History     Socioeconomic History    Marital status: Single   Tobacco Use    Smoking status: Never    Smokeless tobacco: Never   Vaping Use    Vaping Use: Never used   Substance and Sexual Activity    Alcohol use: Yes     Comment: occasional    Drug use: No    Sexual activity: Yes     Partners: Male     Birth control/protection: OCP         Vitals:    06/14/23 1111   BP: 132/62   BP Location: Right arm   Patient Position: Sitting   Pulse: 61   Temp: 96.6 °F (35.9 °C)   SpO2: 98%   Weight: 57.1 kg (125 lb 14.4 oz)   Height: 162.6 cm (64.02\")      Wt Readings from Last 3 Encounters:   06/14/23 57.1 kg (125 lb 14.4 oz)   06/09/23 55.3 kg (122 lb)   11/08/22 54.4 kg (120 lb)     Body mass index is 21.6 kg/m².      Physical Exam  Vitals reviewed.   Constitutional:       General: She is not in acute distress.     Appearance: Normal appearance. She is not ill-appearing.   HENT:      Head: Normocephalic and " atraumatic.      Nose:      Comments: R>L nasal turbiantes erythematous, no polyps noted  Eyes:      General: No scleral icterus.     Conjunctiva/sclera: Conjunctivae normal.   Pulmonary:      Effort: Pulmonary effort is normal. No respiratory distress.      Breath sounds: Normal breath sounds. No stridor. No wheezing, rhonchi or rales.   Lymphadenopathy:      Head:      Right side of head: No submental or submandibular adenopathy.      Left side of head: No submental or submandibular adenopathy.      Cervical:      Right cervical: No superficial, deep or posterior cervical adenopathy.     Left cervical: No superficial, deep or posterior cervical adenopathy.   Neurological:      Mental Status: She is alert and oriented to person, place, and time.   Psychiatric:         Mood and Affect: Mood normal.         Behavior: Behavior normal.          Assessment/Plan:    Diagnoses and all orders for this visit:    1. Acute recurrent maxillary sinusitis (Primary)  Overview:  Allergy Consult previously placed.     Assessment & Plan:  Encouraged adherence to Flonase and patient can continue oral antihistamine and decongestant.

## 2023-06-15 DIAGNOSIS — Z00.00 ANNUAL PHYSICAL EXAM: Primary | ICD-10-CM

## 2023-06-15 DIAGNOSIS — R73.01 ELEVATED FASTING GLUCOSE: ICD-10-CM

## 2023-06-15 DIAGNOSIS — Z13.29 SCREENING FOR THYROID DISORDER: ICD-10-CM

## 2023-06-15 DIAGNOSIS — E55.9 VITAMIN D DEFICIENCY: ICD-10-CM

## 2023-06-27 LAB
25(OH)D3 SERPL-MCNC: 20.2 NG/ML (ref 30–100)
ALBUMIN SERPL-MCNC: 4.1 G/DL (ref 3.5–5.2)
ALBUMIN/GLOB SERPL: 1.7 G/DL
ALP SERPL-CCNC: 56 U/L (ref 39–117)
ALT SERPL W P-5'-P-CCNC: 11 U/L (ref 1–33)
ANION GAP SERPL CALCULATED.3IONS-SCNC: 8.8 MMOL/L (ref 5–15)
AST SERPL-CCNC: 11 U/L (ref 1–32)
BACTERIA UR QL AUTO: ABNORMAL /HPF
BASOPHILS # BLD AUTO: 0.03 10*3/MM3 (ref 0–0.2)
BASOPHILS NFR BLD AUTO: 0.4 % (ref 0–1.5)
BILIRUB SERPL-MCNC: 0.7 MG/DL (ref 0–1.2)
BILIRUB UR QL STRIP: NEGATIVE
BUN SERPL-MCNC: 11 MG/DL (ref 6–20)
BUN/CREAT SERPL: 12.6 (ref 7–25)
CALCIUM SPEC-SCNC: 9.4 MG/DL (ref 8.6–10.5)
CHLORIDE SERPL-SCNC: 104 MMOL/L (ref 98–107)
CHOLEST SERPL-MCNC: 162 MG/DL (ref 0–200)
CLARITY UR: ABNORMAL
CO2 SERPL-SCNC: 26.2 MMOL/L (ref 22–29)
COD CRY URNS QL: ABNORMAL /HPF
COLOR UR: YELLOW
CREAT SERPL-MCNC: 0.87 MG/DL (ref 0.57–1)
DEPRECATED RDW RBC AUTO: 37.3 FL (ref 37–54)
EGFRCR SERPLBLD CKD-EPI 2021: 94.4 ML/MIN/1.73
EOSINOPHIL # BLD AUTO: 0.14 10*3/MM3 (ref 0–0.4)
EOSINOPHIL NFR BLD AUTO: 1.9 % (ref 0.3–6.2)
ERYTHROCYTE [DISTWIDTH] IN BLOOD BY AUTOMATED COUNT: 11.6 % (ref 12.3–15.4)
GLOBULIN UR ELPH-MCNC: 2.4 GM/DL
GLUCOSE SERPL-MCNC: 86 MG/DL (ref 65–99)
GLUCOSE UR STRIP-MCNC: NEGATIVE MG/DL
HBA1C MFR BLD: 5 % (ref 4.8–5.6)
HCT VFR BLD AUTO: 38.6 % (ref 34–46.6)
HDLC SERPL-MCNC: 74 MG/DL (ref 40–60)
HGB BLD-MCNC: 13 G/DL (ref 12–15.9)
HGB UR QL STRIP.AUTO: NEGATIVE
HYALINE CASTS UR QL AUTO: ABNORMAL /LPF
IMM GRANULOCYTES # BLD AUTO: 0.01 10*3/MM3 (ref 0–0.05)
IMM GRANULOCYTES NFR BLD AUTO: 0.1 % (ref 0–0.5)
KETONES UR QL STRIP: ABNORMAL
LDLC SERPL CALC-MCNC: 76 MG/DL (ref 0–100)
LDLC/HDLC SERPL: 1.03 {RATIO}
LEUKOCYTE ESTERASE UR QL STRIP.AUTO: ABNORMAL
LYMPHOCYTES # BLD AUTO: 2.73 10*3/MM3 (ref 0.7–3.1)
LYMPHOCYTES NFR BLD AUTO: 36.8 % (ref 19.6–45.3)
MCH RBC QN AUTO: 30 PG (ref 26.6–33)
MCHC RBC AUTO-ENTMCNC: 33.7 G/DL (ref 31.5–35.7)
MCV RBC AUTO: 88.9 FL (ref 79–97)
MONOCYTES # BLD AUTO: 0.47 10*3/MM3 (ref 0.1–0.9)
MONOCYTES NFR BLD AUTO: 6.3 % (ref 5–12)
NEUTROPHILS NFR BLD AUTO: 4.03 10*3/MM3 (ref 1.7–7)
NEUTROPHILS NFR BLD AUTO: 54.5 % (ref 42.7–76)
NITRITE UR QL STRIP: NEGATIVE
NRBC BLD AUTO-RTO: 0 /100 WBC (ref 0–0.2)
PH UR STRIP.AUTO: 6 [PH] (ref 5–8)
PLATELET # BLD AUTO: 345 10*3/MM3 (ref 140–450)
PMV BLD AUTO: 9 FL (ref 6–12)
POTASSIUM SERPL-SCNC: 3.8 MMOL/L (ref 3.5–5.2)
PROT SERPL-MCNC: 6.5 G/DL (ref 6–8.5)
PROT UR QL STRIP: ABNORMAL
RBC # BLD AUTO: 4.34 10*6/MM3 (ref 3.77–5.28)
RBC # UR STRIP: ABNORMAL /HPF
REF LAB TEST METHOD: ABNORMAL
SODIUM SERPL-SCNC: 139 MMOL/L (ref 136–145)
SP GR UR STRIP: 1.03 (ref 1–1.03)
SQUAMOUS #/AREA URNS HPF: ABNORMAL /HPF
TRIGL SERPL-MCNC: 59 MG/DL (ref 0–150)
TSH SERPL DL<=0.05 MIU/L-ACNC: 1.96 UIU/ML (ref 0.27–4.2)
UROBILINOGEN UR QL STRIP: ABNORMAL
VLDLC SERPL-MCNC: 12 MG/DL (ref 5–40)
WBC # UR STRIP: ABNORMAL /HPF
WBC NRBC COR # BLD: 7.41 10*3/MM3 (ref 3.4–10.8)

## 2023-07-28 ENCOUNTER — OFFICE VISIT (OUTPATIENT)
Dept: OBSTETRICS AND GYNECOLOGY | Age: 28
End: 2023-07-28
Payer: MEDICAID

## 2023-07-28 VITALS
WEIGHT: 127 LBS | DIASTOLIC BLOOD PRESSURE: 58 MMHG | SYSTOLIC BLOOD PRESSURE: 104 MMHG | BODY MASS INDEX: 21.68 KG/M2 | HEIGHT: 64 IN

## 2023-07-28 DIAGNOSIS — N89.8 VAGINAL DISCHARGE: Primary | ICD-10-CM

## 2023-07-28 NOTE — PROGRESS NOTES
"Subjective   Delmy Schaffer is a 27 y.o. female is being seen today for   Chief Complaint   Patient presents with    Follow-up     Pt c/o abnormal discharge, no foul odor and describes her discharge as white and creamy. Pt says she feels like it could be due to trich. Pt also c/o irregular periods, she has not had a period this month but had two last month. She said she may have missed a pill and she is stressed because of finals.    .    History of Present Illness    Patient here with complaints of a change in vaginal discharge  States it is increased in amount  No odor or itching  States she has a history of BV and trich  One partner, no recent new ones  No bowel or bladder problems          The following portions of the patient's history were reviewed and updated as appropriate: allergies, current medications, past family history, past medical history, past social history, past surgical history and problem list.    /58   Ht 162.6 cm (64.02\")   Wt 57.6 kg (127 lb)   BMI 21.79 kg/m²         Review of Systems   Constitutional: Negative.    HENT: Negative.     Eyes: Negative.    Respiratory: Negative.     Cardiovascular: Negative.    Gastrointestinal: Negative.    Endocrine: Negative.    Genitourinary: Negative.    Musculoskeletal: Negative.    Skin: Negative.    Allergic/Immunologic: Negative.    Neurological: Negative.    Hematological: Negative.    Psychiatric/Behavioral: Negative.       Objective   Physical Exam  Constitutional:       Appearance: She is well-developed.   Genitourinary:     Vagina: Normal.      Cervix: No cervical motion tenderness, discharge or friability.      Uterus: Not tender.       Comments: Moderate white discharge noted  Skin:     General: Skin is warm and dry.   Neurological:      Mental Status: She is alert and oriented to person, place, and time.         Assessment & Plan   Diagnoses and all orders for this visit:    1. Vaginal discharge (Primary)  -     NuSwab VG+ - Swab, " Vagina        Vaginal culture sent  Encouraged condoms  Will call with results         Total time spent today with Tia  was 20-29 minutes (level 3).  Greater than 50% of the time was spent coordinating care, answering her questions and counseling regarding pathophysiology of her presenting problem along with plans for any diagnositc work-up and treatment.

## 2023-09-27 ENCOUNTER — OFFICE VISIT (OUTPATIENT)
Dept: OBSTETRICS AND GYNECOLOGY | Age: 28
End: 2023-09-27
Payer: MEDICAID

## 2023-09-27 VITALS
WEIGHT: 122 LBS | BODY MASS INDEX: 20.83 KG/M2 | SYSTOLIC BLOOD PRESSURE: 104 MMHG | HEIGHT: 64 IN | DIASTOLIC BLOOD PRESSURE: 64 MMHG

## 2023-09-27 DIAGNOSIS — Z01.419 WELL WOMAN EXAM WITH ROUTINE GYNECOLOGICAL EXAM: Primary | ICD-10-CM

## 2023-09-27 DIAGNOSIS — Z30.41 ENCOUNTER FOR SURVEILLANCE OF CONTRACEPTIVE PILLS: ICD-10-CM

## 2023-09-27 DIAGNOSIS — Z12.4 SCREENING FOR CERVICAL CANCER: ICD-10-CM

## 2023-09-27 RX ORDER — NORETHINDRONE ACETATE AND ETHINYL ESTRADIOL 1; .02 MG/1; MG/1
1 TABLET ORAL DAILY
Qty: 84 TABLET | Refills: 4 | Status: SHIPPED | OUTPATIENT
Start: 2023-09-27

## 2023-09-27 NOTE — PROGRESS NOTES
Subjective     Chief Complaint   Patient presents with    Gynecologic Exam     AE, last pap 2022 neg  CC:  no problems       History of Present Illness    Delmy Schaffer is a 28 y.o.  who presents for annual exam.    Doing well  OCP's for contraception  Menses are very light, lasting a day or so. Sometimes she will skip menses altogether.   She is exrecising regularly  She is SA with same partner  Had negative std screening July  No GYN concerns or complaints today      Obstetric History:  OB History          0    Para   0    Term   0       0    AB   0    Living   0         SAB   0    IAB   0    Ectopic   0    Molar   0    Multiple   0    Live Births   0               Menstrual History:     Patient's last menstrual period was 2023.         Current contraception: OCP (estrogen/progesterone)  History of abnormal Pap smear: yes - LGSIL  Received Gardasil immunization: yes  Perform regular self breast exam: yes - occl  Family history of uterine or ovarian cancer: no  Family History of colon cancer: no  Family history of breast cancer: no    Mammogram: not indicated.  Colonoscopy: not indicated.  DEXA: not indicated.    Exercise: moderately active  Calcium/Vitamin D: adequate intake    The following portions of the patient's history were reviewed and updated as appropriate: allergies, current medications, past family history, past medical history, past social history, past surgical history, and problem list.    Review of Systems   Constitutional: Negative.    Respiratory: Negative.     Cardiovascular: Negative.    Gastrointestinal: Negative.    Genitourinary: Negative.    Skin: Negative.    Psychiatric/Behavioral: Negative.           Objective   Physical Exam  Constitutional:       General: She is awake.      Appearance: Normal appearance. She is well-developed.   HENT:      Head: Normocephalic and atraumatic.      Nose: Nose normal.   Neck:      Thyroid: No thyroid mass, thyromegaly or  thyroid tenderness.   Cardiovascular:      Rate and Rhythm: Normal rate and regular rhythm.      Pulses: Normal pulses.      Heart sounds: Normal heart sounds.   Pulmonary:      Effort: Pulmonary effort is normal.      Breath sounds: Normal breath sounds.   Chest:   Breasts:     Breasts are symmetrical.      Right: Normal. No swelling, bleeding, inverted nipple, mass, nipple discharge, skin change or tenderness.      Left: Normal. No swelling, bleeding, inverted nipple, mass, nipple discharge, skin change or tenderness.   Abdominal:      General: Abdomen is flat. Bowel sounds are normal.      Palpations: Abdomen is soft.      Tenderness: There is no abdominal tenderness.   Genitourinary:     General: Normal vulva.      Labia:         Right: No rash, tenderness, lesion or injury.         Left: No rash, tenderness, lesion or injury.       Urethra: No prolapse, urethral pain, urethral swelling or urethral lesion.      Vagina: Normal. No signs of injury. No vaginal discharge, erythema, tenderness, bleeding, lesions or prolapsed vaginal walls.      Cervix: Normal. No discharge, friability, lesion, erythema or cervical bleeding.      Uterus: Normal. Not enlarged, not tender and no uterine prolapse.       Adnexa: Right adnexa normal and left adnexa normal.        Right: No mass, tenderness or fullness.          Left: No mass, tenderness or fullness.        Rectum: Normal. No mass.   Musculoskeletal:      Cervical back: Normal range of motion and neck supple.   Lymphadenopathy:      Upper Body:      Right upper body: No supraclavicular adenopathy.      Left upper body: No supraclavicular adenopathy.   Skin:     General: Skin is warm and dry.   Neurological:      General: No focal deficit present.      Mental Status: She is alert and oriented to person, place, and time.   Psychiatric:         Mood and Affect: Mood normal.         Behavior: Behavior normal. Behavior is cooperative.         Thought Content: Thought content  "normal.         Judgment: Judgment normal.       /64   Ht 162.6 cm (64\")   Wt 55.3 kg (122 lb)   LMP 09/13/2023   BMI 20.94 kg/m²     Assessment & Plan   Diagnoses and all orders for this visit:    1. Well woman exam with routine gynecological exam (Primary)    2. Screening for cervical cancer  -     IGP, Rfx Aptima HPV ASCU    3. Encounter for surveillance of contraceptive pills  Comments:  will change to lower dose BCP and monitor mood swings/irritability prior to menses  Orders:  -     norethindrone-ethinyl estradiol (Junel 1/20) 1-20 MG-MCG per tablet; Take 1 tablet by mouth Daily.  Dispense: 84 tablet; Refill: 4        All questions answered.  Breast self exam technique reviewed and patient encouraged to perform self-exam monthly.  Discussed healthy lifestyle modifications.  Recommended 30 minutes of aerobic exercise five times per week.  Discussed calcium needs to prevent osteoporosis.    -F/u 1 year               "

## 2023-10-03 LAB
CONV .: NORMAL
CYTOLOGIST CVX/VAG CYTO: NORMAL
CYTOLOGY CVX/VAG DOC CYTO: NORMAL
CYTOLOGY CVX/VAG DOC THIN PREP: NORMAL
DX ICD CODE: NORMAL
HIV 1 & 2 AB SER-IMP: NORMAL
Lab: NORMAL
OTHER STN SPEC: NORMAL
STAT OF ADQ CVX/VAG CYTO-IMP: NORMAL

## 2024-01-15 DIAGNOSIS — Z30.41 ENCOUNTER FOR SURVEILLANCE OF CONTRACEPTIVE PILLS: ICD-10-CM

## 2024-01-15 RX ORDER — NORETHINDRONE ACETATE AND ETHINYL ESTRADIOL 1; 20 MG/1; UG/1
1 TABLET ORAL DAILY
Qty: 84 TABLET | Refills: 2 | Status: SHIPPED | OUTPATIENT
Start: 2024-01-15

## 2024-02-29 ENCOUNTER — TELEPHONE (OUTPATIENT)
Dept: FAMILY MEDICINE CLINIC | Facility: CLINIC | Age: 29
End: 2024-02-29
Payer: MEDICAID

## 2024-02-29 DIAGNOSIS — U07.1 COVID-19 VIRUS INFECTION: Primary | ICD-10-CM

## 2024-02-29 RX ORDER — FLUTICASONE PROPIONATE 50 MCG
2 SPRAY, SUSPENSION (ML) NASAL DAILY
Qty: 16 G | Refills: 0 | Status: SHIPPED | OUTPATIENT
Start: 2024-02-29 | End: 2024-03-14

## 2024-02-29 RX ORDER — AZELASTINE 1 MG/ML
1 SPRAY, METERED NASAL 2 TIMES DAILY
Qty: 1 EACH | Refills: 0 | Status: SHIPPED | OUTPATIENT
Start: 2024-02-29 | End: 2024-03-14

## 2024-02-29 RX ORDER — BENZONATATE 100 MG/1
100 CAPSULE ORAL 3 TIMES DAILY PRN
Qty: 30 CAPSULE | Refills: 0 | Status: SHIPPED | OUTPATIENT
Start: 2024-02-29

## 2024-02-29 NOTE — TELEPHONE ENCOUNTER
Called and spoke with Patient regarding illness, start of symptoms and treatment options. Detail discussion of paxlovid and that paxlovid metabolized though liver and excreted partially through kidneys;  pt's lab results (6/27/23 bun 11, creat 0.87, egfr 94.4, ast 11, alt 11); Pt verbalizing understanding and stating she wants paxlovid and orders placed. Sent over cough suppressant, and nasal sprays as well. Patient to drink plenty of fluids-water preferably, eat a heart healthy diet, get plenty of sleep and do warm salt water gargles twice a day until feeling better; pt to stay in quarantine for 5 days total.   FABBY Arreguin   ----- Message from Reina Sun sent at 2/29/2024 11:47 AM EST -----  Regarding: FW: Covid Remedies   Contact: [phone]    ----- Message -----  From: Delmy Schaffer  Sent: 2/29/2024  11:44 AM EST  To: Everardo White [facility]  Subject: Covid Remedies                                   I started my symptoms two days ago. I had aches and chills, but those have went away. I just have lingering congestion. I'll stick with the mucinex for now. When should I be concerned if the congestion persists.

## 2024-05-06 ENCOUNTER — OFFICE VISIT (OUTPATIENT)
Dept: FAMILY MEDICINE CLINIC | Facility: CLINIC | Age: 29
End: 2024-05-06
Payer: MEDICAID

## 2024-05-06 VITALS
HEART RATE: 67 BPM | SYSTOLIC BLOOD PRESSURE: 110 MMHG | RESPIRATION RATE: 16 BRPM | BODY MASS INDEX: 21 KG/M2 | WEIGHT: 123 LBS | HEIGHT: 64 IN | TEMPERATURE: 98.1 F | DIASTOLIC BLOOD PRESSURE: 60 MMHG | OXYGEN SATURATION: 97 %

## 2024-05-06 DIAGNOSIS — M67.911 BILATERAL SHOULDER TENDINOPATHY: Primary | ICD-10-CM

## 2024-05-06 DIAGNOSIS — M67.912 BILATERAL SHOULDER TENDINOPATHY: Primary | ICD-10-CM

## 2024-05-06 DIAGNOSIS — S46.812A STRAIN OF LEFT TRAPEZIUS MUSCLE, INITIAL ENCOUNTER: ICD-10-CM

## 2024-05-06 PROCEDURE — 99213 OFFICE O/P EST LOW 20 MIN: CPT | Performed by: NURSE PRACTITIONER

## 2024-05-06 RX ORDER — MELOXICAM 7.5 MG/1
7.5 TABLET ORAL DAILY
Qty: 14 TABLET | Refills: 0 | Status: SHIPPED | OUTPATIENT
Start: 2024-05-06

## 2024-06-04 ENCOUNTER — TELEPHONE (OUTPATIENT)
Dept: FAMILY MEDICINE CLINIC | Facility: CLINIC | Age: 29
End: 2024-06-04

## 2024-06-04 NOTE — TELEPHONE ENCOUNTER
"Relay     \"Please ask patient what date/time works best for lab appt. Info can be sent via [employer]. \"                  "

## 2024-06-04 NOTE — TELEPHONE ENCOUNTER
Hub staff attempted to follow warm transfer process and was unsuccessful     Caller: Delmy Schaffer    Relationship to patient: Self    Best call back number: [phone]     Patient is needing: PATIENT CALLED RESCHEDULE THE PHYSICAL APPT AND NOW NEEDS TO RESCHEDULE THE LAB.  PLEASE CALL PATIENT TO RESCHEDULE LAB FOR THE PHYSICAL SCHEDULED FOR 9/09/24.

## 2024-07-01 DIAGNOSIS — Z00.00 ANNUAL PHYSICAL EXAM: Primary | ICD-10-CM

## 2024-07-01 RX ORDER — EPINEPHRINE 0.3 MG/.3ML
INJECTION SUBCUTANEOUS
COMMUNITY
Start: 2024-05-08

## 2024-07-01 RX ORDER — AZELASTINE HYDROCHLORIDE 137 UG/1
SPRAY, METERED NASAL
COMMUNITY
Start: 2024-05-08

## 2024-07-01 RX ORDER — CETIRIZINE HYDROCHLORIDE 10 MG/1
TABLET ORAL
COMMUNITY
Start: 2024-05-08

## 2024-09-06 ENCOUNTER — TELEMEDICINE (OUTPATIENT)
Dept: FAMILY MEDICINE CLINIC | Facility: CLINIC | Age: 29
End: 2024-09-06
Payer: MEDICAID

## 2024-09-06 DIAGNOSIS — B99.9 LOCALIZED INFECTION: Primary | ICD-10-CM

## 2024-09-06 PROCEDURE — 1160F RVW MEDS BY RX/DR IN RCRD: CPT | Performed by: NURSE PRACTITIONER

## 2024-09-06 PROCEDURE — 1159F MED LIST DOCD IN RCRD: CPT | Performed by: NURSE PRACTITIONER

## 2024-09-06 PROCEDURE — 99213 OFFICE O/P EST LOW 20 MIN: CPT | Performed by: NURSE PRACTITIONER

## 2024-09-06 RX ORDER — MUPIROCIN 20 MG/G
1 OINTMENT TOPICAL 2 TIMES DAILY
Qty: 15 G | Refills: 1 | Status: SHIPPED | OUTPATIENT
Start: 2024-09-06 | End: 2024-09-16

## 2024-09-06 RX ORDER — CEPHALEXIN 500 MG/1
500 CAPSULE ORAL 2 TIMES DAILY
Qty: 20 CAPSULE | Refills: 0 | Status: SHIPPED | OUTPATIENT
Start: 2024-09-06 | End: 2024-09-16

## 2024-09-06 NOTE — PATIENT INSTRUCTIONS
Keep area clean and dry, wash with soap daily, dial soap or equivalent preferred  Do warm epsom salt water soaks daily prn  Start using mupirocin oint to area daily and if no improvement in 2 days or if worsening start cephalexin oral antibiotic.   Recommend taking probiotic daily if starting cephalexin.   Patient to stop squeezing/pressing on area.  Patient verb. Understanding.

## 2024-09-06 NOTE — PROGRESS NOTES
Anh Schaffer is a 29 y.o.. female.     Mode of Visit: Video  Location of patient: other: parking lot  Location of provider: [facility]  You have chosen to receive care through a telehealth visit.  Does the patient consent to use a video/audio connection their medical care today? yes  The visit included audio and video interaction. No technical issues occurred during this visit.      Hand Pain   Incident onset: 4 days. Injury mechanism: thinks she cut herself somehow but does not remember when. Pain location: left thumb. The pain does not radiate. The patient is experiencing no pain. Associated symptoms comments: Clear/light yellow discharge. Treatments tried: squeezing/pressure, neosporin and warm water soaks.       The following portions of the patient's history were reviewed and updated as appropriate: allergies, current medications, past family history, past medical history, past social history, past surgical history and problem list.    Past Medical History:   Diagnosis Date    Abnormal Pap smear of cervix 01/2018    LGSIL    Allergic     Hip pain     Irregular menses        History reviewed. No pertinent surgical history.    Review of Systems   Constitutional:  Negative for fever.   Respiratory: Negative.     Cardiovascular: Negative.    Skin:  Positive for wound.       No Known Allergies    Objective     There were no vitals filed for this visit.  There is no height or weight on file to calculate BMI.    Physical Exam  Constitutional:       Appearance: Normal appearance.   HENT:      Head: Normocephalic and atraumatic.   Pulmonary:      Effort: Pulmonary effort is normal. No respiratory distress.   Skin:     Comments: Left thumb: redness to base of nail with minor swelling noted, no discharge seen; Patient denies warmth   Neurological:      General: No focal deficit present.      Mental Status: She is alert.   Psychiatric:         Mood and Affect: Mood normal.         Behavior: Behavior normal.            Current Outpatient Medications:     Azelastine HCl 137 MCG/SPRAY solution, , Disp: , Rfl:     cetirizine (zyrTEC) 10 MG tablet, , Disp: , Rfl:     EPINEPHrine (EPIPEN) 0.3 MG/0.3ML solution auto-injector injection, , Disp: , Rfl:     fluticasone (FLONASE) 50 MCG/ACT nasal spray, 2 sprays into the nostril(s) as directed by provider Daily for 14 days., Disp: 16 g, Rfl: 0    Junel 1/20 1-20 MG-MCG per tablet, TAKE 1 TABLET BY MOUTH EVERY DAY, Disp: 84 tablet, Rfl: 2    meloxicam (Mobic) 7.5 MG tablet, Take 1 tablet by mouth Daily., Disp: 14 tablet, Rfl: 0    Sod Fluoride-Potassium Nitrate 1.1-5 % gel, Apply 100 mL to teeth 2 (Two) Times a Day., Disp: 100 mL, Rfl: 11    cephalexin (Keflex) 500 MG capsule, Take 1 capsule by mouth 2 (Two) Times a Day for 10 days., Disp: 20 capsule, Rfl: 0    mupirocin (BACTROBAN) 2 % ointment, Apply 1 Application topically to the appropriate area as directed 2 (Two) Times a Day for 10 days., Disp: 15 g, Rfl: 1    No results found for this or any previous visit (from the past 2016 hour(s)).    No image results found.    Diagnoses and all orders for this visit:    1. Localized infection (Primary)  -     mupirocin (BACTROBAN) 2 % ointment; Apply 1 Application topically to the appropriate area as directed 2 (Two) Times a Day for 10 days.  Dispense: 15 g; Refill: 1  -     cephalexin (Keflex) 500 MG capsule; Take 1 capsule by mouth 2 (Two) Times a Day for 10 days.  Dispense: 20 capsule; Refill: 0        Patient Instructions   Keep area clean and dry, wash with soap daily, dial soap or equivalent preferred  Do warm epsom salt water soaks daily prn  Start using mupirocin oint to area daily and if no improvement in 2 days or if worsening start cephalexin oral antibiotic.   Recommend taking probiotic daily if starting cephalexin.   Patient to stop squeezing/pressing on area.  Patient verb. Understanding.     Return if symptoms worsen or fail to improve.

## 2024-09-09 ENCOUNTER — OFFICE VISIT (OUTPATIENT)
Dept: FAMILY MEDICINE CLINIC | Facility: CLINIC | Age: 29
End: 2024-09-09
Payer: MEDICAID

## 2024-09-09 VITALS
HEIGHT: 64 IN | BODY MASS INDEX: 21.22 KG/M2 | DIASTOLIC BLOOD PRESSURE: 70 MMHG | TEMPERATURE: 98.4 F | WEIGHT: 124.3 LBS | OXYGEN SATURATION: 99 % | SYSTOLIC BLOOD PRESSURE: 112 MMHG | HEART RATE: 79 BPM | RESPIRATION RATE: 14 BRPM

## 2024-09-09 DIAGNOSIS — M54.2 CHRONIC NECK PAIN: ICD-10-CM

## 2024-09-09 DIAGNOSIS — G89.29 CHRONIC NECK PAIN: ICD-10-CM

## 2024-09-09 DIAGNOSIS — Z00.00 ANNUAL PHYSICAL EXAM: Primary | ICD-10-CM

## 2024-09-09 DIAGNOSIS — S46.812A STRAIN OF LEFT TRAPEZIUS MUSCLE, INITIAL ENCOUNTER: ICD-10-CM

## 2024-09-09 PROCEDURE — 1159F MED LIST DOCD IN RCRD: CPT | Performed by: NURSE PRACTITIONER

## 2024-09-09 PROCEDURE — 2014F MENTAL STATUS ASSESS: CPT | Performed by: NURSE PRACTITIONER

## 2024-09-09 PROCEDURE — 1126F AMNT PAIN NOTED NONE PRSNT: CPT | Performed by: NURSE PRACTITIONER

## 2024-09-09 PROCEDURE — 1160F RVW MEDS BY RX/DR IN RCRD: CPT | Performed by: NURSE PRACTITIONER

## 2024-09-09 PROCEDURE — 99395 PREV VISIT EST AGE 18-39: CPT | Performed by: NURSE PRACTITIONER

## 2024-09-09 NOTE — PROGRESS NOTES
"Subjective   Delmy Schaffer is a 29 y.o. female. Patient is here today for   Chief Complaint   Patient presents with    Annual Exam     Yearly wellness    Shoulder Pain     C/o lt shoulder pain          Vitals:    09/09/24 0814   BP: 112/70   Pulse: 79   Resp: 14   Temp: 98.4 °F (36.9 °C)   TempSrc: Oral   SpO2: 99%   Weight: 56.4 kg (124 lb 4.8 oz)   Height: 162.6 cm (64.02\")      Body mass index is 21.33 kg/m².    The following portions of the patient's history were reviewed and updated as appropriate: allergies, current medications, past family history, past medical history, past social history, past surgical history and problem list.    Past Medical History:   Diagnosis Date    Abnormal Pap smear of cervix 01/2018    LGSIL    Allergic     Hip pain     Irregular menses       No Known Allergies   Social History     Socioeconomic History    Marital status: Single   Tobacco Use    Smoking status: Never     Passive exposure: Never    Smokeless tobacco: Never   Vaping Use    Vaping status: Never Used   Substance and Sexual Activity    Alcohol use: Yes     Comment: occasional    Drug use: No    Sexual activity: Yes     Partners: Male     Birth control/protection: OCP        Current Outpatient Medications:     Azelastine HCl 137 MCG/SPRAY solution, , Disp: , Rfl:     cephalexin (Keflex) 500 MG capsule, Take 1 capsule by mouth 2 (Two) Times a Day for 10 days., Disp: 20 capsule, Rfl: 0    cetirizine (zyrTEC) 10 MG tablet, , Disp: , Rfl:     EPINEPHrine (EPIPEN) 0.3 MG/0.3ML solution auto-injector injection, , Disp: , Rfl:     fluticasone (FLONASE) 50 MCG/ACT nasal spray, 2 sprays into the nostril(s) as directed by provider Daily for 14 days., Disp: 16 g, Rfl: 0    Junel 1/20 1-20 MG-MCG per tablet, TAKE 1 TABLET BY MOUTH EVERY DAY, Disp: 84 tablet, Rfl: 2    mupirocin (BACTROBAN) 2 % ointment, Apply 1 Application topically to the appropriate area as directed 2 (Two) Times a Day for 10 days., Disp: 15 g, Rfl: 1    Sod " Fluoride-Potassium Nitrate 1.1-5 % gel, Apply 100 mL to teeth 2 (Two) Times a Day., Disp: 100 mL, Rfl: 11         Objective   History of Present Illness  Patient here today for annual physical.    Patient c/o chronic shoulder pain    Patient stating she has not taken the oral antibiotic for her thumb yet, doing the warm water soaks and topical.     Delmy Schaffer 29 y.o. female who presents for an Annual Wellness Visit.  she has a history of   Patient Active Problem List   Diagnosis    Chronic neck pain    Chronic bilateral thoracic back pain    Paresthesia of upper extremity    Other headache syndrome    DDD (degenerative disc disease), cervical    Other insomnia    Acute recurrent maxillary sinusitis    Bilateral shoulder tendinopathy    Strain of left trapezius muscle   .  she has been doing well with new interval problems.  Labs results discussed in detail with the patient.  Plan to update vaccines if needed today.    Health Habits:  Dental Exam. up to date; April 2024  Eye Exam. up to date June 2024  Exercise: 4 times/week.  Current exercise activities include: aerobics; rock climbing, yoga, and running  Diet: somewhat healthy  Water: 64 oz a day at least  1 cup of coffee a day    Preventative counseling  Discussed face to face the importance of healthy diet and exercise.     PHQ-9 Depression Screening  Little interest or pleasure in doing things? 0-->not at all   Feeling down, depressed, or hopeless? 0-->not at all   Trouble falling or staying asleep, or sleeping too much?     Feeling tired or having little energy?     Poor appetite or overeating?     Feeling bad about yourself - or that you are a failure or have let yourself or your family down?     Trouble concentrating on things, such as reading the newspaper or watching television?     Moving or speaking so slowly that other people could have noticed? Or the opposite - being so fidgety or restless that you have been moving around a lot more than usual?      Thoughts that you would be better off dead, or of hurting yourself in some way?     PHQ-9 Total Score 0   If you checked off any problems, how difficult have these problems made it for you to do your work, take care of things at home, or get along with other people?          No results found for this or any previous visit (from the past 336 hour(s)).     Review of Systems   Constitutional: Negative.    HENT: Negative.     Respiratory: Negative.     Cardiovascular: Negative.    Gastrointestinal: Negative.    Genitourinary: Negative.    Musculoskeletal:  Positive for arthralgias (left shoulder).   Neurological: Negative.    Psychiatric/Behavioral: Negative.         Physical Exam  Constitutional:       Appearance: Normal appearance. She is well-developed.   HENT:      Head: Normocephalic and atraumatic.      Right Ear: Tympanic membrane normal. Tympanic membrane is not erythematous.      Left Ear: Tympanic membrane normal. Tympanic membrane is not erythematous.      Nose: Nose normal.   Eyes:      Conjunctiva/sclera: Conjunctivae normal.      Pupils: Pupils are equal, round, and reactive to light.   Neck:      Thyroid: No thyromegaly.      Vascular: No carotid bruit.      Trachea: No tracheal deviation.   Cardiovascular:      Rate and Rhythm: Normal rate and regular rhythm.      Pulses: Normal pulses.      Heart sounds: Normal heart sounds. No murmur heard.  Pulmonary:      Effort: No accessory muscle usage or respiratory distress.      Breath sounds: Normal breath sounds. No stridor. No decreased breath sounds, wheezing, rhonchi or rales.   Abdominal:      General: Bowel sounds are normal. There is no distension.      Palpations: Abdomen is soft. Abdomen is not rigid. There is no mass.      Tenderness: There is no abdominal tenderness. There is no guarding or rebound.      Hernia: No hernia is present.   Musculoskeletal:         General: Normal range of motion.      Left shoulder: Tenderness (trapezius) present. No  swelling, deformity, effusion, bony tenderness or crepitus. Normal range of motion. Normal strength. Normal pulse.      Cervical back: Normal range of motion and neck supple.   Lymphadenopathy:      Cervical: No cervical adenopathy.   Skin:     General: Skin is warm and dry.      Capillary Refill: Capillary refill takes less than 2 seconds.      Comments: Left thumb with slight swelling and redness, no warmth noted, no discharge noted   Neurological:      Mental Status: She is alert and oriented to person, place, and time.      Cranial Nerves: No cranial nerve deficit.      Sensory: No sensory deficit.      Motor: No abnormal muscle tone.      Coordination: Coordination normal.   Psychiatric:         Speech: Speech normal.         Behavior: Behavior normal.         ASSESSMENT       Problems Addressed this Visit          Musculoskeletal and Injuries    Strain of left trapezius muscle     Other Visit Diagnoses       Annual physical exam    -  Primary    Relevant Orders    Lipid Panel With LDL / HDL Ratio    CBC & Differential    Comprehensive Metabolic Panel    Urinalysis With Microscopic - Urine, Clean Catch          Diagnoses         Codes Comments    Annual physical exam    -  Primary ICD-10-CM: Z00.00  ICD-9-CM: V70.0     Strain of left trapezius muscle, initial encounter     ICD-10-CM: S46.812A  ICD-9-CM: 840.8             PLAN    Patient Instructions   Eat a heart healthy diet  Drink plenty of water with goal 64 oz a day  Exercise 3-5 times a week, for more than 30 minutes at a time  Follow up with dentist and optometrist when able  Always use seat belt when in vehicles  Do physical therapy exercises daily at home  Make sure to do shoulder stretching exercises before sports and/or exercising.     Return for fasting labs, Annual physical.

## 2024-09-09 NOTE — PATIENT INSTRUCTIONS
Eat a heart healthy diet  Drink plenty of water with goal 64 oz a day  Exercise 3-5 times a week, for more than 30 minutes at a time  Follow up with dentist and optometrist when able  Always use seat belt when in vehicles  Do physical therapy exercises daily at home  Make sure to do shoulder stretching exercises before sports and/or exercising.

## 2024-09-10 LAB
ALBUMIN SERPL-MCNC: 4.3 G/DL (ref 3.5–5.2)
ALBUMIN/GLOB SERPL: 1.8 G/DL
ALP SERPL-CCNC: 68 U/L (ref 39–117)
ALT SERPL-CCNC: 9 U/L (ref 1–33)
APPEARANCE UR: CLEAR
AST SERPL-CCNC: 16 U/L (ref 1–32)
BACTERIA #/AREA URNS HPF: ABNORMAL /HPF
BASOPHILS # BLD AUTO: 0.04 10*3/MM3 (ref 0–0.2)
BASOPHILS NFR BLD AUTO: 1 % (ref 0–1.5)
BILIRUB SERPL-MCNC: 1.1 MG/DL (ref 0–1.2)
BILIRUB UR QL STRIP: NEGATIVE
BUN SERPL-MCNC: 9 MG/DL (ref 6–20)
BUN/CREAT SERPL: 10.3 (ref 7–25)
CALCIUM SERPL-MCNC: 9.4 MG/DL (ref 8.6–10.5)
CASTS URNS MICRO: ABNORMAL
CHLORIDE SERPL-SCNC: 105 MMOL/L (ref 98–107)
CHOLEST SERPL-MCNC: 147 MG/DL (ref 0–200)
CO2 SERPL-SCNC: 26.1 MMOL/L (ref 22–29)
COLOR UR: ABNORMAL
CREAT SERPL-MCNC: 0.87 MG/DL (ref 0.57–1)
EGFRCR SERPLBLD CKD-EPI 2021: 93.2 ML/MIN/1.73
EOSINOPHIL # BLD AUTO: 0.09 10*3/MM3 (ref 0–0.4)
EOSINOPHIL NFR BLD AUTO: 2.1 % (ref 0.3–6.2)
EPI CELLS #/AREA URNS HPF: ABNORMAL /HPF
ERYTHROCYTE [DISTWIDTH] IN BLOOD BY AUTOMATED COUNT: 11.4 % (ref 12.3–15.4)
GLOBULIN SER CALC-MCNC: 2.4 GM/DL
GLUCOSE SERPL-MCNC: 81 MG/DL (ref 65–99)
GLUCOSE UR QL STRIP: NEGATIVE
HCT VFR BLD AUTO: 40.2 % (ref 34–46.6)
HDLC SERPL-MCNC: 69 MG/DL (ref 40–60)
HGB BLD-MCNC: 13.2 G/DL (ref 12–15.9)
HGB UR QL STRIP: NEGATIVE
IMM GRANULOCYTES # BLD AUTO: 0.01 10*3/MM3 (ref 0–0.05)
IMM GRANULOCYTES NFR BLD AUTO: 0.2 % (ref 0–0.5)
KETONES UR QL STRIP: NEGATIVE
LDLC SERPL CALC-MCNC: 65 MG/DL (ref 0–100)
LDLC/HDLC SERPL: 0.94 {RATIO}
LEUKOCYTE ESTERASE UR QL STRIP: NEGATIVE
LYMPHOCYTES # BLD AUTO: 1.57 10*3/MM3 (ref 0.7–3.1)
LYMPHOCYTES NFR BLD AUTO: 37.5 % (ref 19.6–45.3)
MCH RBC QN AUTO: 30.1 PG (ref 26.6–33)
MCHC RBC AUTO-ENTMCNC: 32.8 G/DL (ref 31.5–35.7)
MCV RBC AUTO: 91.6 FL (ref 79–97)
MONOCYTES # BLD AUTO: 0.34 10*3/MM3 (ref 0.1–0.9)
MONOCYTES NFR BLD AUTO: 8.1 % (ref 5–12)
NEUTROPHILS # BLD AUTO: 2.14 10*3/MM3 (ref 1.7–7)
NEUTROPHILS NFR BLD AUTO: 51.1 % (ref 42.7–76)
NITRITE UR QL STRIP: NEGATIVE
NRBC BLD AUTO-RTO: 0 /100 WBC (ref 0–0.2)
PH UR STRIP: 6 [PH] (ref 5–8)
PLATELET # BLD AUTO: 304 10*3/MM3 (ref 140–450)
POTASSIUM SERPL-SCNC: 4.2 MMOL/L (ref 3.5–5.2)
PROT SERPL-MCNC: 6.7 G/DL (ref 6–8.5)
PROT UR QL STRIP: ABNORMAL
RBC # BLD AUTO: 4.39 10*6/MM3 (ref 3.77–5.28)
RBC #/AREA URNS HPF: ABNORMAL /HPF
SODIUM SERPL-SCNC: 140 MMOL/L (ref 136–145)
SP GR UR STRIP: 1.03 (ref 1–1.03)
TRIGL SERPL-MCNC: 64 MG/DL (ref 0–150)
UROBILINOGEN UR STRIP-MCNC: ABNORMAL MG/DL
VLDLC SERPL CALC-MCNC: 13 MG/DL (ref 5–40)
WBC # BLD AUTO: 4.19 10*3/MM3 (ref 3.4–10.8)
WBC #/AREA URNS HPF: ABNORMAL /HPF

## 2024-10-01 ENCOUNTER — OFFICE VISIT (OUTPATIENT)
Dept: OBSTETRICS AND GYNECOLOGY | Age: 29
End: 2024-10-01
Payer: MEDICAID

## 2024-10-01 VITALS
HEIGHT: 64 IN | WEIGHT: 125 LBS | SYSTOLIC BLOOD PRESSURE: 108 MMHG | DIASTOLIC BLOOD PRESSURE: 70 MMHG | BODY MASS INDEX: 21.34 KG/M2

## 2024-10-01 DIAGNOSIS — Z12.4 SCREENING FOR CERVICAL CANCER: ICD-10-CM

## 2024-10-01 DIAGNOSIS — Z30.41 ENCOUNTER FOR SURVEILLANCE OF CONTRACEPTIVE PILLS: ICD-10-CM

## 2024-10-01 DIAGNOSIS — Z01.419 WELL WOMAN EXAM WITH ROUTINE GYNECOLOGICAL EXAM: Primary | ICD-10-CM

## 2024-10-01 DIAGNOSIS — N89.8 VAGINAL DISCHARGE: ICD-10-CM

## 2024-10-01 DIAGNOSIS — Z30.41 ORAL CONTRACEPTIVE PILL SURVEILLANCE: ICD-10-CM

## 2024-10-01 RX ORDER — NORETHINDRONE ACETATE AND ETHINYL ESTRADIOL .02; 1 MG/1; MG/1
1 TABLET ORAL DAILY
Qty: 84 TABLET | Refills: 3 | Status: SHIPPED | OUTPATIENT
Start: 2024-10-01

## 2024-10-01 NOTE — PROGRESS NOTES
Subjective     Chief Complaint   Patient presents with    Gynecologic Exam     AE, last pap 2023 neg  CC:  no problems       History of Present Illness    Delmy Schaffer is a 29 y.o.  who presents for annual exam.    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         Current contraception: OCP (estrogen/progesterone)  History of abnormal Pap smear: yes - LSIL  Received Gardasil immunization: yes  Perform regular self breast exam: yes - .  Family history of uterine or ovarian cancer: no  Family History of colon cancer:  no  Family history of breast cancer: no    Mammogram: not indicated.  Colonoscopy: not indicated.  DEXA: not indicated.    Exercise: moderately active  Calcium/Vitamin D: adequate intake    The following portions of the patient's history were reviewed and updated as appropriate: allergies, current medications, past family history, past medical history, past social history, past surgical history, and problem list.    Review of Systems   Constitutional: Negative.    Respiratory: Negative.     Cardiovascular: Negative.    Gastrointestinal: Negative.    Genitourinary: Negative.    Skin: Negative.    Psychiatric/Behavioral: Negative.             Objective   Physical Exam  Constitutional:       General: She is awake.      Appearance: Normal appearance. She is well-developed.   HENT:      Head: Normocephalic and atraumatic.      Nose: Nose  normal.   Neck:      Thyroid: No thyroid mass, thyromegaly or thyroid tenderness.   Cardiovascular:      Rate and Rhythm: Normal rate and regular rhythm.      Pulses: Normal pulses.      Heart sounds: Normal heart sounds.   Pulmonary:      Effort: Pulmonary effort is normal.      Breath sounds: Normal breath sounds.   Chest:   Breasts:     Breasts are symmetrical.      Right: Normal. No swelling, bleeding, inverted nipple, mass, nipple discharge, skin change or tenderness.      Left: Normal. No swelling, bleeding, inverted nipple, mass, nipple discharge, skin change or tenderness.   Abdominal:      General: Abdomen is flat. Bowel sounds are normal.      Palpations: Abdomen is soft.      Tenderness: There is no abdominal tenderness.   Genitourinary:     General: Normal vulva.      Labia:         Right: No rash, tenderness, lesion or injury.         Left: No rash, tenderness, lesion or injury.       Urethra: No prolapse, urethral pain, urethral swelling or urethral lesion.      Vagina: Normal. No signs of injury. Vaginal discharge present. No erythema, tenderness, bleeding, lesions or prolapsed vaginal walls.      Cervix: Normal. No discharge, friability, lesion, erythema or cervical bleeding.      Uterus: Normal. Not enlarged, not tender and no uterine prolapse.       Adnexa: Right adnexa normal and left adnexa normal.        Right: No mass, tenderness or fullness.          Left: No mass, tenderness or fullness.        Rectum: Normal. No mass.      Comments: White discharge noted  Musculoskeletal:      Cervical back: Normal range of motion and neck supple.   Lymphadenopathy:      Upper Body:      Right upper body: No supraclavicular adenopathy.      Left upper body: No supraclavicular adenopathy.   Skin:     General: Skin is warm and dry.   Neurological:      General: No focal deficit present.      Mental Status: She is alert and oriented to person, place, and time.   Psychiatric:         Mood and Affect: Mood  "normal.         Behavior: Behavior normal. Behavior is cooperative.         Thought Content: Thought content normal.         Judgment: Judgment normal.         /70   Ht 162.6 cm (64\")   Wt 56.7 kg (125 lb)   LMP 09/09/2024 (Approximate)   BMI 21.46 kg/m²     Assessment & Plan   Diagnoses and all orders for this visit:    1. Well woman exam with routine gynecological exam (Primary)    2. Screening for cervical cancer  -     IGP, Rfx Aptima HPV ASCU    3. Oral contraceptive pill surveillance    4. Encounter for surveillance of contraceptive pills  Comments:  will change to lower dose BCP and monitor mood swings/irritability prior to menses  Orders:  -     norethindrone-ethinyl estradiol (Junel 1/20) 1-20 MG-MCG per tablet; Take 1 tablet by mouth Daily.  Dispense: 84 tablet; Refill: 3    5. Vaginal discharge  -     NuSwab VG+ - Swab, Vagina        All questions answered.  Breast self exam technique reviewed and patient encouraged to perform self-exam monthly.  Discussed healthy lifestyle modifications.  Recommended 30 minutes of aerobic exercise five times per week.  Discussed calcium needs to prevent osteoporosis.    -F/u 1 year               "

## 2024-10-03 LAB
A VAGINAE DNA VAG QL NAA+PROBE: ABNORMAL SCORE
BVAB2 DNA VAG QL NAA+PROBE: ABNORMAL SCORE
C ALBICANS DNA VAG QL NAA+PROBE: NEGATIVE
C GLABRATA DNA VAG QL NAA+PROBE: NEGATIVE
C TRACH DNA SPEC QL NAA+PROBE: NEGATIVE
MEGA1 DNA VAG QL NAA+PROBE: ABNORMAL SCORE
N GONORRHOEA DNA VAG QL NAA+PROBE: NEGATIVE
T VAGINALIS DNA VAG QL NAA+PROBE: NEGATIVE

## 2024-10-03 RX ORDER — METRONIDAZOLE 500 MG/1
500 TABLET ORAL 2 TIMES DAILY
Qty: 14 TABLET | Refills: 0 | Status: SHIPPED | OUTPATIENT
Start: 2024-10-03 | End: 2024-10-10

## 2024-10-04 LAB
CONV .: NORMAL
CYTOLOGIST CVX/VAG CYTO: NORMAL
CYTOLOGY CVX/VAG DOC CYTO: NORMAL
CYTOLOGY CVX/VAG DOC THIN PREP: NORMAL
DX ICD CODE: NORMAL
Lab: NORMAL
OTHER STN SPEC: NORMAL
STAT OF ADQ CVX/VAG CYTO-IMP: NORMAL

## 2024-10-09 ENCOUNTER — OFFICE VISIT (OUTPATIENT)
Dept: FAMILY MEDICINE CLINIC | Facility: CLINIC | Age: 29
End: 2024-10-09
Payer: MEDICAID

## 2024-10-09 VITALS
DIASTOLIC BLOOD PRESSURE: 64 MMHG | HEART RATE: 69 BPM | OXYGEN SATURATION: 98 % | SYSTOLIC BLOOD PRESSURE: 100 MMHG | TEMPERATURE: 97.9 F | RESPIRATION RATE: 14 BRPM | BODY MASS INDEX: 21.94 KG/M2 | HEIGHT: 64 IN | WEIGHT: 128.5 LBS

## 2024-10-09 DIAGNOSIS — R42 VERTIGO: Primary | ICD-10-CM

## 2024-10-09 DIAGNOSIS — H65.92 LEFT SEROUS OTITIS MEDIA, UNSPECIFIED CHRONICITY: ICD-10-CM

## 2024-10-09 PROCEDURE — 1159F MED LIST DOCD IN RCRD: CPT | Performed by: NURSE PRACTITIONER

## 2024-10-09 PROCEDURE — 99213 OFFICE O/P EST LOW 20 MIN: CPT | Performed by: NURSE PRACTITIONER

## 2024-10-09 PROCEDURE — 1160F RVW MEDS BY RX/DR IN RCRD: CPT | Performed by: NURSE PRACTITIONER

## 2024-10-09 PROCEDURE — 93000 ELECTROCARDIOGRAM COMPLETE: CPT | Performed by: NURSE PRACTITIONER

## 2024-10-09 PROCEDURE — 1126F AMNT PAIN NOTED NONE PRSNT: CPT | Performed by: NURSE PRACTITIONER

## 2024-10-09 RX ORDER — AZELASTINE HYDROCHLORIDE 137 UG/1
1 SPRAY, METERED NASAL
Qty: 30 ML | Refills: 1 | Status: SHIPPED | OUTPATIENT
Start: 2024-10-09

## 2024-10-09 RX ORDER — MECLIZINE HCL 12.5 MG 12.5 MG/1
12.5 TABLET ORAL 3 TIMES DAILY PRN
Qty: 30 TABLET | Refills: 1 | Status: SHIPPED | OUTPATIENT
Start: 2024-10-09

## 2024-10-09 RX ORDER — PREDNISONE 20 MG/1
20 TABLET ORAL 2 TIMES DAILY
Qty: 6 TABLET | Refills: 0 | Status: SHIPPED | OUTPATIENT
Start: 2024-10-09 | End: 2024-10-12

## 2024-10-09 NOTE — PROGRESS NOTES
Subjective     Delmy Schaffer is a 29 y.o.. female.     Patient stating she snacks throughout day and eats dinner.     Patient stating she recently got flu shot and then felt sick afterwards; recently dx with BV and currently on metronidazole for that, denies drinking etoh.    Patient works during day on computer.    Dizziness  This is a new problem. Episode onset: 1 week; mostly in evenings. The problem has been gradually worsening. Associated symptoms include abdominal pain (cramping, currently on menses), congestion, fatigue, headaches, nausea and vertigo. Pertinent negatives include no anorexia, chills, coughing, fever, joint swelling, myalgias, numbness, rash, sore throat, urinary symptoms, visual change or vomiting. Associated symptoms comments: Left sided head pressure, ears feel clogged; when dizziness happens feels like the room is spinning.. Nothing aggravates the symptoms.   Headache  Nausea  Associated symptoms include abdominal pain (cramping, currently on menses), congestion, fatigue, headaches, nausea and vertigo. Pertinent negatives include no anorexia, chills, coughing, fever, joint swelling, myalgias, numbness, rash, sore throat, urinary symptoms, visual change or vomiting.       The following portions of the patient's history were reviewed and updated as appropriate: allergies, current medications, past family history, past medical history, past social history, past surgical history and problem list.    Past Medical History:   Diagnosis Date    Abnormal Pap smear of cervix 01/2018    LGSIL    Allergic     Hip pain     Irregular menses        History reviewed. No pertinent surgical history.    Review of Systems   Constitutional:  Positive for fatigue. Negative for chills and fever.   HENT:  Positive for congestion, ear pain, rhinorrhea and sinus pressure. Negative for sore throat.    Respiratory:  Negative for cough and shortness of breath.    Gastrointestinal:  Positive for abdominal pain (cramping,  "currently on menses) and nausea. Negative for anorexia, constipation, diarrhea and vomiting.        Currently on menses   Musculoskeletal:  Negative for joint swelling and myalgias.   Skin:  Negative for rash.   Neurological:  Positive for dizziness, vertigo and headaches. Negative for numbness.       No Known Allergies    Objective     Vitals:    10/09/24 0814   BP: 100/64   BP Location: Right arm   Patient Position: Sitting   Cuff Size: Adult   Pulse: 69   Resp: 14   Temp: 97.9 °F (36.6 °C)   TempSrc: Oral   SpO2: 98%   Weight: 58.3 kg (128 lb 8 oz)   Height: 162.6 cm (64.02\")     Body mass index is 22.05 kg/m².    Physical Exam  Vitals reviewed.   Constitutional:       Appearance: She is well-developed.   HENT:      Head: Normocephalic and atraumatic.      Right Ear: Tympanic membrane normal. Tympanic membrane is not erythematous.      Left Ear: Tympanic membrane normal. A middle ear effusion (clear fluid noted) is present. Tympanic membrane is not erythematous.      Nose: Nose normal. Congestion and rhinorrhea present.      Mouth/Throat:      Mouth: Mucous membranes are moist.      Pharynx: No pharyngeal swelling, oropharyngeal exudate or posterior oropharyngeal erythema.   Eyes:      Conjunctiva/sclera: Conjunctivae normal.      Pupils: Pupils are equal, round, and reactive to light.   Cardiovascular:      Rate and Rhythm: Normal rate and regular rhythm.      Heart sounds: No murmur heard.  Pulmonary:      Effort: Pulmonary effort is normal.      Breath sounds: Normal breath sounds. No stridor. No wheezing, rhonchi or rales.   Abdominal:      General: Bowel sounds are normal. There is no distension.      Palpations: Abdomen is soft. There is no hepatomegaly or splenomegaly.      Tenderness: There is abdominal tenderness in the right lower quadrant and left lower quadrant. There is no guarding or rebound. Negative signs include McBurney's sign.   Musculoskeletal:         General: Normal range of motion. "   Lymphadenopathy:      Cervical: No cervical adenopathy.   Skin:     General: Skin is warm and dry.   Neurological:      Mental Status: She is alert and oriented to person, place, and time.           Current Outpatient Medications:     Azelastine HCl 137 MCG/SPRAY solution, Administer 1 spray into the nostril(s) as directed by provider twice a day., Disp: 30 mL, Rfl: 1    cetirizine (zyrTEC) 10 MG tablet, , Disp: , Rfl:     EPINEPHrine (EPIPEN) 0.3 MG/0.3ML solution auto-injector injection, , Disp: , Rfl:     metroNIDAZOLE (Flagyl) 500 MG tablet, Take 1 tablet by mouth 2 (Two) Times a Day for 7 days. Do not drink alcohol while taking., Disp: 14 tablet, Rfl: 0    norethindrone-ethinyl estradiol (Junel 1/20) 1-20 MG-MCG per tablet, Take 1 tablet by mouth Daily., Disp: 84 tablet, Rfl: 3    Sod Fluoride-Potassium Nitrate 1.1-5 % gel, Apply 100 mL to teeth 2 (Two) Times a Day., Disp: 100 mL, Rfl: 11    fluticasone (FLONASE) 50 MCG/ACT nasal spray, 2 sprays into the nostril(s) as directed by provider Daily for 14 days., Disp: 16 g, Rfl: 0    meclizine (ANTIVERT) 12.5 MG tablet, Take 1 tablet by mouth 3 (Three) Times a Day As Needed for Dizziness., Disp: 30 tablet, Rfl: 1    predniSONE (DELTASONE) 20 MG tablet, Take 1 tablet by mouth 2 (Two) Times a Day for 3 days., Disp: 6 tablet, Rfl: 0    Recent Results (from the past 2016 hour(s))   Lipid Panel With LDL / HDL Ratio    Collection Time: 09/09/24  8:49 AM    Specimen: Blood   Result Value Ref Range    Total Cholesterol 147 0 - 200 mg/dL    Triglycerides 64 0 - 150 mg/dL    HDL Cholesterol 69 (H) 40 - 60 mg/dL    VLDL Cholesterol Freddy 13 5 - 40 mg/dL    LDL Chol Calc (NIH) 65 0 - 100 mg/dL    LDL/HDL RATIO 0.94    CBC & Differential    Collection Time: 09/09/24  8:49 AM    Specimen: Blood   Result Value Ref Range    WBC 4.19 3.40 - 10.80 10*3/mm3    RBC 4.39 3.77 - 5.28 10*6/mm3    Hemoglobin 13.2 12.0 - 15.9 g/dL    Hematocrit 40.2 34.0 - 46.6 %    MCV 91.6 79.0 - 97.0 fL     MCH 30.1 26.6 - 33.0 pg    MCHC 32.8 31.5 - 35.7 g/dL    RDW 11.4 (L) 12.3 - 15.4 %    Platelets 304 140 - 450 10*3/mm3    Neutrophil Rel % 51.1 42.7 - 76.0 %    Lymphocyte Rel % 37.5 19.6 - 45.3 %    Monocyte Rel % 8.1 5.0 - 12.0 %    Eosinophil Rel % 2.1 0.3 - 6.2 %    Basophil Rel % 1.0 0.0 - 1.5 %    Neutrophils Absolute 2.14 1.70 - 7.00 10*3/mm3    Lymphocytes Absolute 1.57 0.70 - 3.10 10*3/mm3    Monocytes Absolute 0.34 0.10 - 0.90 10*3/mm3    Eosinophils Absolute 0.09 0.00 - 0.40 10*3/mm3    Basophils Absolute 0.04 0.00 - 0.20 10*3/mm3    Immature Granulocyte Rel % 0.2 0.0 - 0.5 %    Immature Grans Absolute 0.01 0.00 - 0.05 10*3/mm3    nRBC 0.0 0.0 - 0.2 /100 WBC   Comprehensive Metabolic Panel    Collection Time: 09/09/24  8:49 AM    Specimen: Blood   Result Value Ref Range    Glucose 81 65 - 99 mg/dL    BUN 9 6 - 20 mg/dL    Creatinine 0.87 0.57 - 1.00 mg/dL    EGFR Result 93.2 >60.0 mL/min/1.73    BUN/Creatinine Ratio 10.3 7.0 - 25.0    Sodium 140 136 - 145 mmol/L    Potassium 4.2 3.5 - 5.2 mmol/L    Chloride 105 98 - 107 mmol/L    Total CO2 26.1 22.0 - 29.0 mmol/L    Calcium 9.4 8.6 - 10.5 mg/dL    Total Protein 6.7 6.0 - 8.5 g/dL    Albumin 4.3 3.5 - 5.2 g/dL    Globulin 2.4 gm/dL    A/G Ratio 1.8 g/dL    Total Bilirubin 1.1 0.0 - 1.2 mg/dL    Alkaline Phosphatase 68 39 - 117 U/L    AST (SGOT) 16 1 - 32 U/L    ALT (SGPT) 9 1 - 33 U/L   Urinalysis With Microscopic - Urine, Clean Catch    Collection Time: 09/09/24  8:49 AM    Specimen: Urine, Clean Catch   Result Value Ref Range    Specific Gravity, UA 1.030 1.005 - 1.030    pH, UA 6.0 5.0 - 8.0    Color, UA See below: (A)     Appearance, UA Clear Clear    Leukocytes, UA Negative Negative    Protein Trace (A) Negative    Glucose, UA Negative Negative    Ketones Negative Negative    Blood, UA Negative Negative    Bilirubin, UA Negative Negative    Urobilinogen, UA Comment     Nitrite, UA Negative Negative   Microscopic Examination -    Collection Time:  09/09/24  8:49 AM   Result Value Ref Range    WBC, UA 0-2 /HPF    RBC, UA 0-2 /HPF    Epithelial Cells (non renal) 7-12 (A) /HPF    Cast Type Comment     Bacteria, UA Trace (A) None Seen /HPF   NuSwab VG+ - Swab, Vagina    Collection Time: 10/01/24 10:47 AM    Specimen: Vagina; Swab    VA   Result Value Ref Range    Atopobium Vaginae High - 2 (A) Score    BVAB 2 Low - 0 Score    Megasphaera 1 High - 2 (A) Score    Janelle Albicans, HOOD Negative Negative    Candida Glabrata, HOOD Negative Negative    Trichomonas vaginosis Negative Negative    Chlamydia trachomatis, HOOD Negative Negative    Neisseria gonorrhoeae, HOOD Negative Negative   IGP, Rfx Aptima HPV ASCU    Collection Time: 10/01/24 10:47 AM    Specimen: Cervix; ThinPrep Vial   Result Value Ref Range    Diagnosis Comment     Specimen adequacy: Comment     Clinician Provided ICD-10: Comment     Performed by: Comment     . .     Note: Comment     Method: Comment     Conv .conv Comment      EKG done in office today: S. Arrhythmia, vent rate 50, TX int 166, QRS 86    Diagnoses and all orders for this visit:    1. Vertigo (Primary)  -     meclizine (ANTIVERT) 12.5 MG tablet; Take 1 tablet by mouth 3 (Three) Times a Day As Needed for Dizziness.  Dispense: 30 tablet; Refill: 1  -     ECG 12 Lead    2. Left serous otitis media, unspecified chronicity  -     predniSONE (DELTASONE) 20 MG tablet; Take 1 tablet by mouth 2 (Two) Times a Day for 3 days.  Dispense: 6 tablet; Refill: 0  -     Azelastine HCl 137 MCG/SPRAY solution; Administer 1 spray into the nostril(s) as directed by provider twice a day.  Dispense: 30 mL; Refill: 1        Patient Instructions   Drink plenty of fluids-water and sports drinks preferably, eat a heart healthy diet, and get plenty of sleep; stop or reduce caffeine intake. If need take meclizine as prescribed for dizziness. If dizziness worsens with any activity stop that activity. If new symptoms develop call/rto. If symptoms worsen go to ER  immediately.     Return if symptoms worsen or fail to improve.

## 2024-10-09 NOTE — PATIENT INSTRUCTIONS
Drink plenty of fluids-water and sports drinks preferably, eat a heart healthy diet, and get plenty of sleep; stop or reduce caffeine intake. If need take meclizine as prescribed for dizziness. If dizziness worsens with any activity stop that activity. If new symptoms develop call/rto. If symptoms worsen go to ER immediately.

## 2024-10-11 DIAGNOSIS — Z30.41 ENCOUNTER FOR SURVEILLANCE OF CONTRACEPTIVE PILLS: ICD-10-CM

## 2024-10-11 RX ORDER — NORETHINDRONE ACETATE AND ETHINYL ESTRADIOL 1; 20 MG/1; UG/1
1 TABLET ORAL DAILY
Qty: 84 TABLET | Refills: 2 | Status: SHIPPED | OUTPATIENT
Start: 2024-10-11

## 2024-10-18 ENCOUNTER — OFFICE VISIT (OUTPATIENT)
Dept: FAMILY MEDICINE CLINIC | Facility: CLINIC | Age: 29
End: 2024-10-18
Payer: MEDICAID

## 2024-10-18 VITALS
RESPIRATION RATE: 17 BRPM | HEIGHT: 64 IN | BODY MASS INDEX: 20.71 KG/M2 | DIASTOLIC BLOOD PRESSURE: 76 MMHG | SYSTOLIC BLOOD PRESSURE: 122 MMHG | OXYGEN SATURATION: 97 % | TEMPERATURE: 98.7 F | WEIGHT: 121.3 LBS | HEART RATE: 64 BPM

## 2024-10-18 DIAGNOSIS — H65.93 BILATERAL SEROUS OTITIS MEDIA, UNSPECIFIED CHRONICITY: Primary | ICD-10-CM

## 2024-10-18 DIAGNOSIS — J30.9 ALLERGIC RHINITIS, UNSPECIFIED SEASONALITY, UNSPECIFIED TRIGGER: ICD-10-CM

## 2024-10-18 PROCEDURE — 1159F MED LIST DOCD IN RCRD: CPT | Performed by: NURSE PRACTITIONER

## 2024-10-18 PROCEDURE — 99213 OFFICE O/P EST LOW 20 MIN: CPT | Performed by: NURSE PRACTITIONER

## 2024-10-18 PROCEDURE — 1160F RVW MEDS BY RX/DR IN RCRD: CPT | Performed by: NURSE PRACTITIONER

## 2024-10-18 PROCEDURE — 1125F AMNT PAIN NOTED PAIN PRSNT: CPT | Performed by: NURSE PRACTITIONER

## 2024-10-18 RX ORDER — PREDNISONE 20 MG/1
20 TABLET ORAL 2 TIMES DAILY
Qty: 6 TABLET | Refills: 0 | Status: SHIPPED | OUTPATIENT
Start: 2024-10-18 | End: 2024-10-21

## 2024-10-18 NOTE — PATIENT INSTRUCTIONS
Continue to get allergy shots, follow up with allergist  Sending over another script of prednisone 20 mg 1 tab twice a day for 3 days, Patient to take script completely.  Patient to continue nasal spray daily.

## 2024-10-18 NOTE — PROGRESS NOTES
"Subjective     Delmy Schaffer is a 29 y.o.. female.     Patient here today with c/o dizziness, nausea and headache that has been ongoing. Patient was seen last week, dx with serous otitis and vertigo. Patient admitting she took only one dose of prednisone daily for 3 days. Patient stating she has been taking the nasal spray. Patient stating her last menses was 10/3. Patient stating she was feeling ok on Saturday but symptoms returned on Sunday. Patient stating her symptoms were worse on Thursday while cleaning her room. Patient does admit to feeling better after taking the prednisone.      The following portions of the patient's history were reviewed and updated as appropriate: allergies, current medications, past family history, past medical history, past social history, past surgical history and problem list.    Past Medical History:   Diagnosis Date    Abnormal Pap smear of cervix 01/2018    LGSIL    Allergic     Hip pain     Irregular menses        History reviewed. No pertinent surgical history.    Review of Systems   Constitutional: Negative.  Negative for fever.   Respiratory: Negative.     Cardiovascular: Negative.    Gastrointestinal:  Positive for nausea. Negative for abdominal pain, constipation, diarrhea and vomiting.   Neurological:  Positive for dizziness and headaches.       No Known Allergies    Objective     Vitals:    10/18/24 1305   BP: 122/76   BP Location: Right arm   Patient Position: Sitting   Cuff Size: Adult   Pulse: 64   Resp: 17   Temp: 98.7 °F (37.1 °C)   TempSrc: Temporal   SpO2: 97%   Weight: 55 kg (121 lb 4.8 oz)   Height: 162.6 cm (64.02\")     Body mass index is 20.81 kg/m².    Physical Exam  Vitals reviewed.   Constitutional:       Appearance: She is well-developed.   HENT:      Head: Normocephalic and atraumatic.      Right Ear: A middle ear effusion (clear fluid, moderate) is present. Tympanic membrane is not erythematous.      Left Ear: A middle ear effusion (clear fluid, minor) is " present. Tympanic membrane is not erythematous.      Nose: Nose normal.      Mouth/Throat:      Mouth: Mucous membranes are moist.      Pharynx: No pharyngeal swelling, oropharyngeal exudate or posterior oropharyngeal erythema.   Eyes:      Conjunctiva/sclera: Conjunctivae normal.      Pupils: Pupils are equal, round, and reactive to light.   Cardiovascular:      Rate and Rhythm: Normal rate and regular rhythm.      Heart sounds: No murmur heard.  Pulmonary:      Effort: Pulmonary effort is normal.      Breath sounds: No wheezing, rhonchi or rales.   Musculoskeletal:         General: Normal range of motion.   Lymphadenopathy:      Cervical: No cervical adenopathy.   Skin:     General: Skin is warm and dry.   Neurological:      Mental Status: She is alert and oriented to person, place, and time.           Current Outpatient Medications:     Azelastine HCl 137 MCG/SPRAY solution, Administer 1 spray into the nostril(s) as directed by provider twice a day., Disp: 30 mL, Rfl: 1    cetirizine (zyrTEC) 10 MG tablet, , Disp: , Rfl:     EPINEPHrine (EPIPEN) 0.3 MG/0.3ML solution auto-injector injection, , Disp: , Rfl:     Junel 1/20 1-20 MG-MCG per tablet, TAKE 1 TABLET BY MOUTH EVERY DAY, Disp: 84 tablet, Rfl: 2    meclizine (ANTIVERT) 12.5 MG tablet, Take 1 tablet by mouth 3 (Three) Times a Day As Needed for Dizziness., Disp: 30 tablet, Rfl: 1    Sod Fluoride-Potassium Nitrate 1.1-5 % gel, Apply 100 mL to teeth 2 (Two) Times a Day., Disp: 100 mL, Rfl: 11    fluticasone (FLONASE) 50 MCG/ACT nasal spray, 2 sprays into the nostril(s) as directed by provider Daily for 14 days., Disp: 16 g, Rfl: 0    predniSONE (DELTASONE) 20 MG tablet, Take 1 tablet by mouth 2 (Two) Times a Day for 3 days., Disp: 6 tablet, Rfl: 0    Recent Results (from the past 4 weeks)   NuSwab VG+ - Swab, Vagina    Collection Time: 10/01/24 10:47 AM    Specimen: Vagina; Swab    VA   Result Value Ref Range    Atopobium Vaginae High - 2 (A) Score    BVAB 2  Low - 0 Score    Megasphaera 1 High - 2 (A) Score    Janelle Albicans, HOOD Negative Negative    Candida Glabrata, HOOD Negative Negative    Trichomonas vaginosis Negative Negative    Chlamydia trachomatis, HOOD Negative Negative    Neisseria gonorrhoeae, HOOD Negative Negative   IGP, Rfx Aptima HPV ASCU    Collection Time: 10/01/24 10:47 AM    Specimen: Cervix; ThinPrep Vial   Result Value Ref Range    Diagnosis Comment     Specimen adequacy: Comment     Clinician Provided ICD-10: Comment     Performed by: Comment     . .     Note: Comment     Method: Comment     Conv .conv Comment        Diagnoses and all orders for this visit:    1. Bilateral serous otitis media, unspecified chronicity (Primary)  -     predniSONE (DELTASONE) 20 MG tablet; Take 1 tablet by mouth 2 (Two) Times a Day for 3 days.  Dispense: 6 tablet; Refill: 0    2. Allergic rhinitis, unspecified seasonality, unspecified trigger        Patient Instructions   Continue to get allergy shots, follow up with allergist  Sending over another script of prednisone 20 mg 1 tab twice a day for 3 days, Patient to take script completely.  Patient to continue nasal spray daily.      Return if symptoms worsen or fail to improve.

## 2025-01-20 ENCOUNTER — OFFICE VISIT (OUTPATIENT)
Dept: FAMILY MEDICINE CLINIC | Facility: CLINIC | Age: 30
End: 2025-01-20
Payer: COMMERCIAL

## 2025-01-20 VITALS
HEART RATE: 50 BPM | BODY MASS INDEX: 21.85 KG/M2 | RESPIRATION RATE: 17 BRPM | HEIGHT: 64 IN | SYSTOLIC BLOOD PRESSURE: 102 MMHG | TEMPERATURE: 98.5 F | DIASTOLIC BLOOD PRESSURE: 60 MMHG | WEIGHT: 128 LBS | OXYGEN SATURATION: 100 %

## 2025-01-20 DIAGNOSIS — R42 VERTIGO: ICD-10-CM

## 2025-01-20 DIAGNOSIS — R00.1 BRADYCARDIA: ICD-10-CM

## 2025-01-20 DIAGNOSIS — I95.9 HYPOTENSION, UNSPECIFIED HYPOTENSION TYPE: ICD-10-CM

## 2025-01-20 DIAGNOSIS — G44.89 OTHER HEADACHE SYNDROME: Primary | ICD-10-CM

## 2025-01-20 LAB
25(OH)D3+25(OH)D2 SERPL-MCNC: 34.2 NG/ML (ref 30–100)
ALBUMIN SERPL-MCNC: 4.2 G/DL (ref 3.5–5.2)
ALBUMIN/GLOB SERPL: 1.4 G/DL
ALP SERPL-CCNC: 70 U/L (ref 39–117)
ALT SERPL-CCNC: 9 U/L (ref 1–33)
AST SERPL-CCNC: 13 U/L (ref 1–32)
BASOPHILS # BLD AUTO: 0.04 10*3/MM3 (ref 0–0.2)
BASOPHILS NFR BLD AUTO: 0.7 % (ref 0–1.5)
BILIRUB SERPL-MCNC: 0.5 MG/DL (ref 0–1.2)
BUN SERPL-MCNC: 10 MG/DL (ref 6–20)
BUN/CREAT SERPL: 12.3 (ref 7–25)
CALCIUM SERPL-MCNC: 9.4 MG/DL (ref 8.6–10.5)
CHLORIDE SERPL-SCNC: 103 MMOL/L (ref 98–107)
CO2 SERPL-SCNC: 25.9 MMOL/L (ref 22–29)
CREAT SERPL-MCNC: 0.81 MG/DL (ref 0.57–1)
EGFRCR SERPLBLD CKD-EPI 2021: 101.5 ML/MIN/1.73
EOSINOPHIL # BLD AUTO: 0.08 10*3/MM3 (ref 0–0.4)
EOSINOPHIL NFR BLD AUTO: 1.5 % (ref 0.3–6.2)
ERYTHROCYTE [DISTWIDTH] IN BLOOD BY AUTOMATED COUNT: 11.4 % (ref 12.3–15.4)
GLOBULIN SER CALC-MCNC: 2.9 GM/DL
GLUCOSE SERPL-MCNC: 91 MG/DL (ref 65–99)
HCT VFR BLD AUTO: 42.6 % (ref 34–46.6)
HGB BLD-MCNC: 13.8 G/DL (ref 12–15.9)
IMM GRANULOCYTES # BLD AUTO: 0.01 10*3/MM3 (ref 0–0.05)
IMM GRANULOCYTES NFR BLD AUTO: 0.2 % (ref 0–0.5)
LYMPHOCYTES # BLD AUTO: 2.08 10*3/MM3 (ref 0.7–3.1)
LYMPHOCYTES NFR BLD AUTO: 38.2 % (ref 19.6–45.3)
MCH RBC QN AUTO: 29.7 PG (ref 26.6–33)
MCHC RBC AUTO-ENTMCNC: 32.4 G/DL (ref 31.5–35.7)
MCV RBC AUTO: 91.8 FL (ref 79–97)
MONOCYTES # BLD AUTO: 0.35 10*3/MM3 (ref 0.1–0.9)
MONOCYTES NFR BLD AUTO: 6.4 % (ref 5–12)
NEUTROPHILS # BLD AUTO: 2.88 10*3/MM3 (ref 1.7–7)
NEUTROPHILS NFR BLD AUTO: 53 % (ref 42.7–76)
NRBC BLD AUTO-RTO: 0 /100 WBC (ref 0–0.2)
PLATELET # BLD AUTO: 322 10*3/MM3 (ref 140–450)
POTASSIUM SERPL-SCNC: 4.6 MMOL/L (ref 3.5–5.2)
PROT SERPL-MCNC: 7.1 G/DL (ref 6–8.5)
RBC # BLD AUTO: 4.64 10*6/MM3 (ref 3.77–5.28)
SODIUM SERPL-SCNC: 138 MMOL/L (ref 136–145)
WBC # BLD AUTO: 5.44 10*3/MM3 (ref 3.4–10.8)

## 2025-01-20 PROCEDURE — 99214 OFFICE O/P EST MOD 30 MIN: CPT | Performed by: NURSE PRACTITIONER

## 2025-01-20 NOTE — PROGRESS NOTES
Anh Schaffer is a 29 y.o.. female.     Patient has a history of vertigo October 2024; history of chronic neck pain    Patient admits she is not drinking as much water as she should; Patient stating she is exercising more recently.     Headache  Headache pattern: headache on thursday and Friday; none since then.  Location: bilateral frontal headache.  Dizziness  Episode onset: Thursday and Friday; none since then.   Episode frequency: constantly on thursday and friday; none since.   Symptoms include fatigue, headaches and nausea.    Pertinent negative symptoms include no abdominal pain, no chest pain, no congestion, no cough, no fever, no neck pain, no sore throat and no vomiting.   Nausea  Episode onset: thursday and Friday; none since.   Episode frequency: constantly on Thursday and Friday; none since.   Symptoms include fatigue, headaches and nausea.    Pertinent negative symptoms include no abdominal pain, no chest pain, no congestion, no cough, no fever, no neck pain, no sore throat and no vomiting.       The following portions of the patient's history were reviewed and updated as appropriate: allergies, current medications, past family history, past medical history, past social history, past surgical history and problem list.    Past Medical History:   Diagnosis Date    Abnormal Pap smear of cervix 01/2018    LGSIL    Allergic     Hip pain     Irregular menses        History reviewed. No pertinent surgical history.    Review of Systems   Constitutional:  Positive for fatigue. Negative for fever.   HENT:  Positive for postnasal drip. Negative for congestion, ear pain, rhinorrhea and sore throat.    Respiratory: Negative.  Negative for cough and shortness of breath.    Cardiovascular:  Negative for chest pain and palpitations.   Gastrointestinal:  Positive for nausea. Negative for abdominal pain, constipation, diarrhea and vomiting.   Genitourinary: Negative.    Musculoskeletal: Negative.  Negative  "for neck pain.   Neurological:  Positive for dizziness and headaches.   Psychiatric/Behavioral:  Positive for sleep disturbance. The patient is not nervous/anxious.        No Known Allergies    Objective     Vitals:    01/20/25 0925 01/20/25 0947 01/20/25 0948   BP: 100/70 100/70 102/60   BP Location: Right arm     Patient Position: Sitting Standing Lying   Cuff Size: Adult     Pulse: 59 57 50   Resp: 17     Temp: 98.5 °F (36.9 °C)     TempSrc: Temporal     SpO2: 98%  100%   Weight: 58.1 kg (128 lb)     Height: 162.6 cm (64.02\")       Body mass index is 21.96 kg/m².    Physical Exam  Vitals reviewed.   Constitutional:       Appearance: She is well-developed.   HENT:      Head: Normocephalic and atraumatic.      Right Ear: Tympanic membrane normal. Tympanic membrane is not erythematous.      Left Ear: Tympanic membrane normal. Tympanic membrane is not erythematous.      Nose: Nose normal.      Mouth/Throat:      Mouth: Mucous membranes are moist.      Pharynx: No pharyngeal swelling, oropharyngeal exudate or posterior oropharyngeal erythema.   Eyes:      Conjunctiva/sclera: Conjunctivae normal.      Pupils: Pupils are equal, round, and reactive to light.   Cardiovascular:      Rate and Rhythm: Normal rate and regular rhythm.      Heart sounds: No murmur heard.  Pulmonary:      Effort: Pulmonary effort is normal.      Breath sounds: No wheezing, rhonchi or rales.   Abdominal:      General: There is no distension.      Palpations: Abdomen is soft. There is no hepatomegaly or splenomegaly.      Tenderness: There is abdominal tenderness in the left upper quadrant. There is no guarding or rebound. Negative signs include McBurney's sign.      Hernia: No hernia is present.   Musculoskeletal:         General: Normal range of motion.   Lymphadenopathy:      Cervical: No cervical adenopathy.   Skin:     General: Skin is warm and dry.   Neurological:      Mental Status: She is alert and oriented to person, place, and time. "           Current Outpatient Medications:     Azelastine HCl 137 MCG/SPRAY solution, Administer 1 spray into the nostril(s) as directed by provider twice a day., Disp: 30 mL, Rfl: 1    cetirizine (zyrTEC) 10 MG tablet, , Disp: , Rfl:     EPINEPHrine (EPIPEN) 0.3 MG/0.3ML solution auto-injector injection, , Disp: , Rfl:     Junel 1/20 1-20 MG-MCG per tablet, TAKE 1 TABLET BY MOUTH EVERY DAY, Disp: 84 tablet, Rfl: 2    meclizine (ANTIVERT) 12.5 MG tablet, Take 1 tablet by mouth 3 (Three) Times a Day As Needed for Dizziness., Disp: 30 tablet, Rfl: 1    Sod Fluoride-Potassium Nitrate 1.1-5 % gel, Apply 100 mL to teeth 2 (Two) Times a Day., Disp: 100 mL, Rfl: 11    fluticasone (FLONASE) 50 MCG/ACT nasal spray, 2 sprays into the nostril(s) as directed by provider Daily for 14 days., Disp: 16 g, Rfl: 0    No results found for this or any previous visit (from the past 12 weeks).    No image results found.        Diagnoses and all orders for this visit:    1. Other headache syndrome (Primary)  -     CBC & Differential  -     Comprehensive metabolic panel  -     Vitamin D,25-Hydroxy    2. Vertigo  -     CBC & Differential  -     Comprehensive metabolic panel  -     Vitamin D,25-Hydroxy    3. Hypotension, unspecified hypotension type  Comments:  recommend increasing water intake    4. Bradycardia  Comments:  believe due to the increase in working out; EKG 10/9/24 showed Sinus Arrhythmia vent rate 50        Patient Instructions   Discussed imaging; Patient stating she thinks she has had MRI head in past, none noted in computer; Patient to look and see if she can find out where/when and inform provider; if not recommend CT  head for further eval. Patient verb. Understanding.  Ordering labs for further eval.   Since symptoms have cleared it could be vertigo brought on by viral infection. Patient to continue to monitor for symptoms and increase water intake.    Return if symptoms worsen or fail to improve.

## 2025-01-20 NOTE — PATIENT INSTRUCTIONS
Discussed imaging; Patient stating she thinks she has had MRI head in past, none noted in computer; Patient to look and see if she can find out where/when and inform provider; if not recommend CT  head for further eval. Patient verb. Understanding.  Ordering labs for further eval.   Since symptoms have cleared it could be vertigo brought on by viral infection. Patient to continue to monitor for symptoms and increase water intake.

## 2025-03-03 DIAGNOSIS — G89.29 CHRONIC LEFT SHOULDER PAIN: Primary | ICD-10-CM

## 2025-03-03 DIAGNOSIS — M25.512 CHRONIC LEFT SHOULDER PAIN: Primary | ICD-10-CM

## 2025-03-05 ENCOUNTER — TELEPHONE (OUTPATIENT)
Dept: FAMILY MEDICINE CLINIC | Facility: CLINIC | Age: 30
End: 2025-03-05

## 2025-03-05 NOTE — TELEPHONE ENCOUNTER
Caller: CHELY ( [employer])    Relationship:     Best call back number: [phone]     What is the best time to reach you: 8- 5PM    Who are you requesting to speak with (clinical staff, provider,  specific staff member): CLINICAL STAFF     Do you know the name of the person who called: CHELY    What was the call regarding: CALLER CALLED REGARDING A REFERRAL FOR PATIENT. CALLER IS REQUESTING REFERRAL BE FAXED [phone]    Is it okay if the provider responds through [employer]:

## 2025-03-10 RX ORDER — FLUCONAZOLE 150 MG/1
150 TABLET ORAL ONCE
Qty: 1 TABLET | Refills: 0 | Status: SHIPPED | OUTPATIENT
Start: 2025-03-10 | End: 2025-03-10

## 2025-03-24 ENCOUNTER — OFFICE VISIT (OUTPATIENT)
Dept: FAMILY MEDICINE CLINIC | Facility: CLINIC | Age: 30
End: 2025-03-24
Payer: COMMERCIAL

## 2025-03-24 VITALS
BODY MASS INDEX: 21.82 KG/M2 | TEMPERATURE: 98 F | DIASTOLIC BLOOD PRESSURE: 80 MMHG | HEIGHT: 64 IN | OXYGEN SATURATION: 99 % | WEIGHT: 127.8 LBS | SYSTOLIC BLOOD PRESSURE: 102 MMHG | HEART RATE: 60 BPM

## 2025-03-24 DIAGNOSIS — L67.9 HAIR CHANGES: Primary | ICD-10-CM

## 2025-03-24 PROCEDURE — 99213 OFFICE O/P EST LOW 20 MIN: CPT | Performed by: NURSE PRACTITIONER

## 2025-03-24 NOTE — PATIENT INSTRUCTIONS
Discussed Patient's assessment, no receeding hair line  Discussed how scalp treatment and hair styles affect outcomes of hair  Discussed otc treatment options

## 2025-03-24 NOTE — PROGRESS NOTES
"Subjective     Demly Schaffer is a 29 y.o.. female.     Patient here today with concerns of receeding hair line. Patient stating she has noticed her hair in front close to her forehead were different lengths and appears to be less than before. Patient admits to putting her hair in eunice and putting in loose pony tails.         The following portions of the patient's history were reviewed and updated as appropriate: allergies, current medications, past family history, past medical history, past social history, past surgical history and problem list.    Past Medical History:   Diagnosis Date    Abnormal Pap smear of cervix 01/2018    LGSIL    Allergic     Hip pain     Irregular menses        No past surgical history on file.    Review of Systems   Constitutional: Negative.    HENT:          Concerns of receeding hair line   Respiratory: Negative.     Cardiovascular: Negative.        No Known Allergies    Objective     Vitals:    03/24/25 1304   BP: 102/80   BP Location: Right arm   Patient Position: Sitting   Cuff Size: Adult   Pulse: 60   Temp: 98 °F (36.7 °C)   TempSrc: Oral   SpO2: 99%   Weight: 58 kg (127 lb 12.8 oz)   Height: 162.6 cm (64.02\")     Body mass index is 21.93 kg/m².    Physical Exam  Vitals reviewed.   HENT:      Head: Normocephalic.   Eyes:      Pupils: Pupils are equal, round, and reactive to light.   Cardiovascular:      Rate and Rhythm: Normal rate and regular rhythm.   Pulmonary:      Effort: Pulmonary effort is normal.      Breath sounds: Normal breath sounds.   Musculoskeletal:         General: Normal range of motion.   Skin:     General: Skin is warm and dry.   Neurological:      Mental Status: She is alert and oriented to person, place, and time.   Psychiatric:         Behavior: Behavior normal.           Current Outpatient Medications:     cetirizine (zyrTEC) 10 MG tablet, , Disp: , Rfl:     Junel 1/20 1-20 MG-MCG per tablet, TAKE 1 TABLET BY MOUTH EVERY DAY, Disp: 84 tablet, Rfl: 2    Sod " Fluoride-Potassium Nitrate 1.1-5 % gel, Apply 100 mL to teeth 2 (Two) Times a Day., Disp: 100 mL, Rfl: 11    Azelastine HCl 137 MCG/SPRAY solution, Administer 1 spray into the nostril(s) as directed by provider twice a day. (Patient not taking: Reported on 3/24/2025), Disp: 30 mL, Rfl: 1    EPINEPHrine (EPIPEN) 0.3 MG/0.3ML solution auto-injector injection, , Disp: , Rfl:     fluticasone (FLONASE) 50 MCG/ACT nasal spray, 2 sprays into the nostril(s) as directed by provider Daily for 14 days., Disp: 16 g, Rfl: 0    meclizine (ANTIVERT) 12.5 MG tablet, Take 1 tablet by mouth 3 (Three) Times a Day As Needed for Dizziness. (Patient not taking: Reported on 3/24/2025), Disp: 30 tablet, Rfl: 1        Diagnoses and all orders for this visit:    1. Hair changes (Primary)      Discussed Patient's assessment, no receeding hair line  Discussed how scalp treatment and hair styles affect outcomes of hair  Discussed otc treatment options    Follow up as needed, if symptoms worsen